# Patient Record
Sex: FEMALE | Race: WHITE | NOT HISPANIC OR LATINO | Employment: FULL TIME | URBAN - METROPOLITAN AREA
[De-identification: names, ages, dates, MRNs, and addresses within clinical notes are randomized per-mention and may not be internally consistent; named-entity substitution may affect disease eponyms.]

---

## 2017-06-06 PROCEDURE — 88305 TISSUE EXAM BY PATHOLOGIST: CPT | Performed by: OBSTETRICS & GYNECOLOGY

## 2017-06-07 ENCOUNTER — LAB REQUISITION (OUTPATIENT)
Dept: LAB | Facility: HOSPITAL | Age: 27
End: 2017-06-07
Payer: COMMERCIAL

## 2017-06-07 DIAGNOSIS — R87.612 LOW GRADE SQUAMOUS INTRAEPITHELIAL LESION ON CYTOLOGIC SMEAR OF CERVIX (LGSIL): ICD-10-CM

## 2017-07-19 ENCOUNTER — ANESTHESIA EVENT (OUTPATIENT)
Dept: PERIOP | Facility: HOSPITAL | Age: 27
End: 2017-07-19
Payer: COMMERCIAL

## 2017-07-20 ENCOUNTER — HOSPITAL ENCOUNTER (OUTPATIENT)
Facility: HOSPITAL | Age: 27
Setting detail: OUTPATIENT SURGERY
Discharge: HOME | End: 2017-07-20
Attending: OBSTETRICS & GYNECOLOGY | Admitting: OBSTETRICS & GYNECOLOGY
Payer: COMMERCIAL

## 2017-07-20 ENCOUNTER — ANESTHESIA (OUTPATIENT)
Dept: PERIOP | Facility: HOSPITAL | Age: 27
End: 2017-07-20
Payer: COMMERCIAL

## 2017-07-20 VITALS
HEART RATE: 63 BPM | HEIGHT: 60 IN | RESPIRATION RATE: 16 BRPM | OXYGEN SATURATION: 99 % | DIASTOLIC BLOOD PRESSURE: 66 MMHG | BODY MASS INDEX: 21.6 KG/M2 | SYSTOLIC BLOOD PRESSURE: 101 MMHG | TEMPERATURE: 97.5 F | WEIGHT: 110 LBS

## 2017-07-20 DIAGNOSIS — D06.9 SEVERE CERVICAL DYSPLASIA: ICD-10-CM

## 2017-07-20 PROBLEM — Z98.890 S/P LEEP: Status: ACTIVE | Noted: 2017-07-20

## 2017-07-20 LAB — EXT PREGNANCY TEST URINE: NEGATIVE

## 2017-07-20 PROCEDURE — 81025 URINE PREGNANCY TEST: CPT | Performed by: OBSTETRICS & GYNECOLOGY

## 2017-07-20 PROCEDURE — 88307 TISSUE EXAM BY PATHOLOGIST: CPT | Performed by: OBSTETRICS & GYNECOLOGY

## 2017-07-20 RX ORDER — FENTANYL CITRATE/PF 50 MCG/ML
50 SYRINGE (ML) INJECTION
Status: DISCONTINUED | OUTPATIENT
Start: 2017-07-20 | End: 2017-07-20 | Stop reason: HOSPADM

## 2017-07-20 RX ORDER — IODINE SOLUTION STRONG 5% (LUGOL'S) 5 %
SOLUTION ORAL AS NEEDED
Status: DISCONTINUED | OUTPATIENT
Start: 2017-07-20 | End: 2017-07-20 | Stop reason: HOSPADM

## 2017-07-20 RX ORDER — FENTANYL CITRATE 50 UG/ML
INJECTION, SOLUTION INTRAMUSCULAR; INTRAVENOUS AS NEEDED
Status: DISCONTINUED | OUTPATIENT
Start: 2017-07-20 | End: 2017-07-20 | Stop reason: SURG

## 2017-07-20 RX ORDER — IBUPROFEN 600 MG/1
600 TABLET ORAL EVERY 6 HOURS PRN
Status: DISCONTINUED | OUTPATIENT
Start: 2017-07-20 | End: 2017-07-20 | Stop reason: HOSPADM

## 2017-07-20 RX ORDER — KETOROLAC TROMETHAMINE 30 MG/ML
INJECTION, SOLUTION INTRAMUSCULAR; INTRAVENOUS AS NEEDED
Status: DISCONTINUED | OUTPATIENT
Start: 2017-07-20 | End: 2017-07-20 | Stop reason: SURG

## 2017-07-20 RX ORDER — MEPERIDINE HYDROCHLORIDE 50 MG/ML
12.5 INJECTION INTRAMUSCULAR; INTRAVENOUS; SUBCUTANEOUS AS NEEDED
Status: DISCONTINUED | OUTPATIENT
Start: 2017-07-20 | End: 2017-07-20 | Stop reason: HOSPADM

## 2017-07-20 RX ORDER — PROPOFOL 10 MG/ML
INJECTION, EMULSION INTRAVENOUS AS NEEDED
Status: DISCONTINUED | OUTPATIENT
Start: 2017-07-20 | End: 2017-07-20 | Stop reason: SURG

## 2017-07-20 RX ORDER — FERRIC SUBSULFATE 0.21 G/G
LIQUID TOPICAL AS NEEDED
Status: DISCONTINUED | OUTPATIENT
Start: 2017-07-20 | End: 2017-07-20 | Stop reason: HOSPADM

## 2017-07-20 RX ORDER — LIDOCAINE HYDROCHLORIDE 10 MG/ML
INJECTION, SOLUTION INFILTRATION; PERINEURAL AS NEEDED
Status: DISCONTINUED | OUTPATIENT
Start: 2017-07-20 | End: 2017-07-20 | Stop reason: SURG

## 2017-07-20 RX ORDER — SODIUM CHLORIDE 9 MG/ML
125 INJECTION, SOLUTION INTRAVENOUS CONTINUOUS
Status: DISCONTINUED | OUTPATIENT
Start: 2017-07-20 | End: 2017-07-20 | Stop reason: HOSPADM

## 2017-07-20 RX ORDER — ONDANSETRON 2 MG/ML
4 INJECTION INTRAMUSCULAR; INTRAVENOUS EVERY 6 HOURS PRN
Status: DISCONTINUED | OUTPATIENT
Start: 2017-07-20 | End: 2017-07-20 | Stop reason: HOSPADM

## 2017-07-20 RX ORDER — ONDANSETRON 2 MG/ML
INJECTION INTRAMUSCULAR; INTRAVENOUS AS NEEDED
Status: DISCONTINUED | OUTPATIENT
Start: 2017-07-20 | End: 2017-07-20 | Stop reason: SURG

## 2017-07-20 RX ORDER — MIDAZOLAM HYDROCHLORIDE 1 MG/ML
INJECTION INTRAMUSCULAR; INTRAVENOUS AS NEEDED
Status: DISCONTINUED | OUTPATIENT
Start: 2017-07-20 | End: 2017-07-20 | Stop reason: SURG

## 2017-07-20 RX ADMIN — LIDOCAINE HYDROCHLORIDE 60 MG: 10 INJECTION, SOLUTION INFILTRATION; PERINEURAL at 08:34

## 2017-07-20 RX ADMIN — MIDAZOLAM HYDROCHLORIDE 2 MG: 1 INJECTION, SOLUTION INTRAMUSCULAR; INTRAVENOUS at 08:32

## 2017-07-20 RX ADMIN — KETOROLAC TROMETHAMINE 30 MG: 30 INJECTION, SOLUTION INTRAMUSCULAR at 08:54

## 2017-07-20 RX ADMIN — FENTANYL CITRATE 50 MCG: 50 INJECTION, SOLUTION INTRAMUSCULAR; INTRAVENOUS at 08:50

## 2017-07-20 RX ADMIN — PROPOFOL 300 MG: 10 INJECTION, EMULSION INTRAVENOUS at 08:34

## 2017-07-20 RX ADMIN — SODIUM CHLORIDE 125 ML/HR: 0.9 INJECTION, SOLUTION INTRAVENOUS at 07:50

## 2017-07-20 RX ADMIN — DEXAMETHASONE SODIUM PHOSPHATE 8 MG: 10 INJECTION INTRAMUSCULAR; INTRAVENOUS at 08:44

## 2017-07-20 RX ADMIN — FENTANYL CITRATE 50 MCG: 50 INJECTION, SOLUTION INTRAMUSCULAR; INTRAVENOUS at 08:40

## 2017-07-20 RX ADMIN — ONDANSETRON HYDROCHLORIDE 8 MG: 2 INJECTION, SOLUTION INTRAVENOUS at 08:44

## 2019-11-20 PROCEDURE — G0145 SCR C/V CYTO,THINLAYER,RESCR: HCPCS | Performed by: OBSTETRICS & GYNECOLOGY

## 2019-11-20 PROCEDURE — 87624 HPV HI-RISK TYP POOLED RSLT: CPT | Performed by: OBSTETRICS & GYNECOLOGY

## 2019-11-21 ENCOUNTER — LAB REQUISITION (OUTPATIENT)
Dept: LAB | Facility: HOSPITAL | Age: 29
End: 2019-11-21
Payer: COMMERCIAL

## 2019-11-21 DIAGNOSIS — Z01.419 ENCOUNTER FOR GYNECOLOGICAL EXAMINATION (GENERAL) (ROUTINE) WITHOUT ABNORMAL FINDINGS: ICD-10-CM

## 2019-11-23 LAB
HPV HR 12 DNA CVX QL NAA+PROBE: NEGATIVE
HPV16 DNA CVX QL NAA+PROBE: NEGATIVE
HPV18 DNA CVX QL NAA+PROBE: NEGATIVE

## 2019-11-26 LAB
LAB AP GYN PRIMARY INTERPRETATION: NORMAL
LAB AP LMP: NORMAL
Lab: NORMAL

## 2021-02-24 ENCOUNTER — LAB (OUTPATIENT)
Dept: LAB | Facility: HOSPITAL | Age: 31
End: 2021-02-24
Attending: OBSTETRICS & GYNECOLOGY
Payer: COMMERCIAL

## 2021-02-24 ENCOUNTER — TRANSCRIBE ORDERS (OUTPATIENT)
Dept: ADMINISTRATIVE | Facility: HOSPITAL | Age: 31
End: 2021-02-24

## 2021-02-24 DIAGNOSIS — Z32.01 PREGNANCY EXAMINATION OR TEST, POSITIVE RESULT: ICD-10-CM

## 2021-02-24 DIAGNOSIS — O20.9 HEMORRHAGE BEFORE 22 WEEKS GESTATION: ICD-10-CM

## 2021-02-24 DIAGNOSIS — Z32.01 PREGNANCY EXAMINATION OR TEST, POSITIVE RESULT: Primary | ICD-10-CM

## 2021-02-24 LAB
ABO GROUP BLD: NORMAL
B-HCG SERPL-ACNC: 26.8 MIU/ML
BLD GP AB SCN SERPL QL: NEGATIVE
HCG SERPL QL: POSITIVE
RH BLD: POSITIVE
SPECIMEN EXPIRATION DATE: NORMAL

## 2021-02-24 PROCEDURE — 36415 COLL VENOUS BLD VENIPUNCTURE: CPT

## 2021-02-24 PROCEDURE — 84702 CHORIONIC GONADOTROPIN TEST: CPT

## 2021-02-24 PROCEDURE — 86850 RBC ANTIBODY SCREEN: CPT

## 2021-02-24 PROCEDURE — 86900 BLOOD TYPING SEROLOGIC ABO: CPT

## 2021-02-24 PROCEDURE — 86901 BLOOD TYPING SEROLOGIC RH(D): CPT

## 2021-03-01 ENCOUNTER — LAB (OUTPATIENT)
Dept: LAB | Facility: HOSPITAL | Age: 31
End: 2021-03-01
Attending: OBSTETRICS & GYNECOLOGY
Payer: COMMERCIAL

## 2021-03-01 DIAGNOSIS — Z32.01 PREGNANCY EXAMINATION OR TEST, POSITIVE RESULT: ICD-10-CM

## 2021-03-01 DIAGNOSIS — O20.9 HEMORRHAGE BEFORE 22 WEEKS GESTATION: ICD-10-CM

## 2021-03-01 LAB — B-HCG SERPL-ACNC: 21 MIU/ML

## 2021-03-01 PROCEDURE — 36415 COLL VENOUS BLD VENIPUNCTURE: CPT

## 2021-03-01 PROCEDURE — 84702 CHORIONIC GONADOTROPIN TEST: CPT

## 2021-03-08 ENCOUNTER — LAB (OUTPATIENT)
Dept: LAB | Facility: HOSPITAL | Age: 31
End: 2021-03-08
Attending: OBSTETRICS & GYNECOLOGY
Payer: COMMERCIAL

## 2021-03-08 DIAGNOSIS — Z32.01 PREGNANCY EXAMINATION OR TEST, POSITIVE RESULT: ICD-10-CM

## 2021-03-08 DIAGNOSIS — O20.9 HEMORRHAGE BEFORE 22 WEEKS GESTATION: ICD-10-CM

## 2021-03-08 LAB — B-HCG SERPL-ACNC: <2 MIU/ML

## 2021-03-08 PROCEDURE — 84702 CHORIONIC GONADOTROPIN TEST: CPT

## 2021-03-08 PROCEDURE — 36415 COLL VENOUS BLD VENIPUNCTURE: CPT

## 2021-08-13 PROCEDURE — G0145 SCR C/V CYTO,THINLAYER,RESCR: HCPCS | Performed by: OBSTETRICS & GYNECOLOGY

## 2021-08-13 PROCEDURE — G0476 HPV COMBO ASSAY CA SCREEN: HCPCS | Performed by: OBSTETRICS & GYNECOLOGY

## 2021-08-16 ENCOUNTER — LAB REQUISITION (OUTPATIENT)
Dept: LAB | Facility: HOSPITAL | Age: 31
End: 2021-08-16
Payer: COMMERCIAL

## 2021-08-16 DIAGNOSIS — Z12.4 ENCOUNTER FOR SCREENING FOR MALIGNANT NEOPLASM OF CERVIX: ICD-10-CM

## 2021-08-18 LAB
HPV HR 12 DNA CVX QL NAA+PROBE: NEGATIVE
HPV16 DNA CVX QL NAA+PROBE: NEGATIVE
HPV18 DNA CVX QL NAA+PROBE: NEGATIVE
LAB AP GYN PRIMARY INTERPRETATION: NORMAL
Lab: NORMAL

## 2021-08-19 ENCOUNTER — APPOINTMENT (OUTPATIENT)
Dept: LAB | Age: 31
End: 2021-08-19
Payer: COMMERCIAL

## 2021-08-19 DIAGNOSIS — N97.8 FEMALE INFERTILITY OF OTHER ORIGIN: ICD-10-CM

## 2021-08-19 LAB — PROGEST SERPL-MCNC: 8.2 NG/ML

## 2021-08-19 PROCEDURE — 84144 ASSAY OF PROGESTERONE: CPT

## 2021-08-19 PROCEDURE — 36415 COLL VENOUS BLD VENIPUNCTURE: CPT

## 2021-09-22 ENCOUNTER — APPOINTMENT (OUTPATIENT)
Dept: LAB | Age: 31
End: 2021-09-22
Payer: COMMERCIAL

## 2021-09-22 DIAGNOSIS — Z31.41 ENCOUNTER FOR FERTILITY TESTING: ICD-10-CM

## 2021-09-22 LAB
PROLACTIN SERPL-MCNC: 5.5 NG/ML
TSH SERPL DL<=0.05 MIU/L-ACNC: 1.2 UIU/ML (ref 0.36–3.74)

## 2021-09-22 PROCEDURE — 84146 ASSAY OF PROLACTIN: CPT

## 2021-09-22 PROCEDURE — 36415 COLL VENOUS BLD VENIPUNCTURE: CPT

## 2021-09-22 PROCEDURE — 84443 ASSAY THYROID STIM HORMONE: CPT

## 2021-10-18 ENCOUNTER — HOSPITAL ENCOUNTER (OUTPATIENT)
Dept: RADIOLOGY | Facility: HOSPITAL | Age: 31
Discharge: HOME | End: 2021-10-18
Attending: OBSTETRICS & GYNECOLOGY | Admitting: RADIOLOGY
Payer: COMMERCIAL

## 2021-10-18 DIAGNOSIS — Z31.41 ENCOUNTER FOR FERTILITY TESTING: ICD-10-CM

## 2021-10-18 PROCEDURE — 58340 CATHETER FOR HYSTEROGRAPHY: CPT

## 2021-10-18 PROCEDURE — 74740 X-RAY FEMALE GENITAL TRACT: CPT

## 2021-10-18 RX ADMIN — IOHEXOL 4 ML: 240 INJECTION, SOLUTION INTRATHECAL; INTRAVASCULAR; INTRAVENOUS; ORAL at 09:05

## 2022-01-10 ENCOUNTER — APPOINTMENT (OUTPATIENT)
Dept: LAB | Age: 32
End: 2022-01-10
Payer: COMMERCIAL

## 2022-01-10 DIAGNOSIS — N97.9 PRIMARY FEMALE INFERTILITY: ICD-10-CM

## 2022-01-10 LAB — ESTRADIOL SERPL-MCNC: 129 PG/ML

## 2022-01-10 PROCEDURE — 82670 ASSAY OF TOTAL ESTRADIOL: CPT

## 2022-01-10 PROCEDURE — 36415 COLL VENOUS BLD VENIPUNCTURE: CPT

## 2022-01-17 ENCOUNTER — APPOINTMENT (OUTPATIENT)
Dept: LAB | Age: 32
End: 2022-01-17
Payer: COMMERCIAL

## 2022-01-17 DIAGNOSIS — N97.9 PRIMARY FEMALE INFERTILITY: ICD-10-CM

## 2022-01-17 LAB — PROGEST SERPL-MCNC: 4 NG/ML

## 2022-01-17 PROCEDURE — 84144 ASSAY OF PROGESTERONE: CPT

## 2022-01-17 PROCEDURE — 36415 COLL VENOUS BLD VENIPUNCTURE: CPT

## 2022-02-11 ENCOUNTER — APPOINTMENT (OUTPATIENT)
Dept: LAB | Age: 32
End: 2022-02-11
Payer: COMMERCIAL

## 2022-02-11 DIAGNOSIS — N97.9 FEMALE INFERTILITY, UNSPECIFIED: ICD-10-CM

## 2022-02-11 LAB — PROGEST SERPL-MCNC: 19.2 NG/ML

## 2022-02-11 PROCEDURE — 84144 ASSAY OF PROGESTERONE: CPT

## 2022-02-11 PROCEDURE — 36415 COLL VENOUS BLD VENIPUNCTURE: CPT

## 2022-03-02 ENCOUNTER — APPOINTMENT (OUTPATIENT)
Dept: LAB | Age: 32
End: 2022-03-02
Payer: COMMERCIAL

## 2022-03-02 DIAGNOSIS — N97.9 FEMALE INFERTILITY: ICD-10-CM

## 2022-03-02 LAB — ESTRADIOL SERPL-MCNC: 742 PG/ML

## 2022-03-02 PROCEDURE — 82670 ASSAY OF TOTAL ESTRADIOL: CPT

## 2022-03-02 PROCEDURE — 36415 COLL VENOUS BLD VENIPUNCTURE: CPT

## 2022-03-09 ENCOUNTER — APPOINTMENT (OUTPATIENT)
Dept: LAB | Age: 32
End: 2022-03-09
Payer: COMMERCIAL

## 2022-03-09 DIAGNOSIS — N97.9 FEMALE INFERTILITY, UNSPECIFIED: ICD-10-CM

## 2022-03-09 LAB — PROGEST SERPL-MCNC: 7.7 NG/ML

## 2022-03-09 PROCEDURE — 36415 COLL VENOUS BLD VENIPUNCTURE: CPT

## 2022-03-09 PROCEDURE — 84144 ASSAY OF PROGESTERONE: CPT

## 2022-04-06 ENCOUNTER — APPOINTMENT (OUTPATIENT)
Dept: LAB | Age: 32
End: 2022-04-06
Payer: COMMERCIAL

## 2022-04-06 DIAGNOSIS — N97.0 FEMALE INFERTILITY ASSOCIATED WITH ANOVULATION: ICD-10-CM

## 2022-04-06 LAB — PROGEST SERPL-MCNC: 8.2 NG/ML

## 2022-04-06 PROCEDURE — 36415 COLL VENOUS BLD VENIPUNCTURE: CPT

## 2022-04-06 PROCEDURE — 84144 ASSAY OF PROGESTERONE: CPT

## 2022-05-06 ENCOUNTER — APPOINTMENT (OUTPATIENT)
Dept: LAB | Age: 32
End: 2022-05-06
Payer: COMMERCIAL

## 2022-05-06 DIAGNOSIS — Z32.01 PREGNANCY EXAMINATION OR TEST, POSITIVE RESULT: ICD-10-CM

## 2022-05-06 LAB — B-HCG SERPL-ACNC: 14 MIU/ML

## 2022-05-06 PROCEDURE — 36415 COLL VENOUS BLD VENIPUNCTURE: CPT

## 2022-05-06 PROCEDURE — 84702 CHORIONIC GONADOTROPIN TEST: CPT

## 2022-05-12 ENCOUNTER — APPOINTMENT (OUTPATIENT)
Dept: LAB | Age: 32
End: 2022-05-12
Payer: COMMERCIAL

## 2022-05-12 DIAGNOSIS — Z32.01 PREGNANCY EXAMINATION OR TEST, POSITIVE RESULT: ICD-10-CM

## 2022-05-12 LAB — B-HCG SERPL-ACNC: 354 MIU/ML

## 2022-05-12 PROCEDURE — 36415 COLL VENOUS BLD VENIPUNCTURE: CPT

## 2022-05-12 PROCEDURE — 84702 CHORIONIC GONADOTROPIN TEST: CPT

## 2022-05-19 ENCOUNTER — APPOINTMENT (OUTPATIENT)
Dept: LAB | Age: 32
End: 2022-05-19
Payer: COMMERCIAL

## 2022-05-19 DIAGNOSIS — Z32.01 PREGNANCY EXAMINATION OR TEST, POSITIVE RESULT: ICD-10-CM

## 2022-05-19 LAB — B-HCG SERPL-ACNC: 5198 MIU/ML

## 2022-05-19 PROCEDURE — 84702 CHORIONIC GONADOTROPIN TEST: CPT

## 2022-05-19 PROCEDURE — 36415 COLL VENOUS BLD VENIPUNCTURE: CPT

## 2022-07-05 ENCOUNTER — APPOINTMENT (OUTPATIENT)
Dept: LAB | Age: 32
End: 2022-07-05
Payer: COMMERCIAL

## 2022-07-05 DIAGNOSIS — Z36.89 SCREENING FOR PREGNANCY-ASSOCIATED PLASMA PROTEIN A: ICD-10-CM

## 2022-07-05 DIAGNOSIS — Z3A.08 8 WEEKS GESTATION OF PREGNANCY: ICD-10-CM

## 2022-07-05 LAB
ABO GROUP BLD: NORMAL
ERYTHROCYTE [DISTWIDTH] IN BLOOD BY AUTOMATED COUNT: 12.7 % (ref 11.6–15.1)
HBV SURFACE AG SER QL: NORMAL
HCT VFR BLD AUTO: 38 % (ref 34.8–46.1)
HCV AB SER QL: NORMAL
HGB BLD-MCNC: 13.1 G/DL (ref 11.5–15.4)
MCH RBC QN AUTO: 30.1 PG (ref 26.8–34.3)
MCHC RBC AUTO-ENTMCNC: 34.5 G/DL (ref 31.4–37.4)
MCV RBC AUTO: 87 FL (ref 82–98)
PLATELET # BLD AUTO: 258 THOUSANDS/UL (ref 149–390)
PMV BLD AUTO: 11.3 FL (ref 8.9–12.7)
RBC # BLD AUTO: 4.35 MILLION/UL (ref 3.81–5.12)
RH BLD: POSITIVE
RUBV IGG SERPL IA-ACNC: 155.1 IU/ML
WBC # BLD AUTO: 9.03 THOUSAND/UL (ref 4.31–10.16)

## 2022-07-05 PROCEDURE — 86900 BLOOD TYPING SEROLOGIC ABO: CPT

## 2022-07-05 PROCEDURE — 86787 VARICELLA-ZOSTER ANTIBODY: CPT

## 2022-07-05 PROCEDURE — 86901 BLOOD TYPING SEROLOGIC RH(D): CPT

## 2022-07-05 PROCEDURE — 80307 DRUG TEST PRSMV CHEM ANLYZR: CPT

## 2022-07-05 PROCEDURE — 87340 HEPATITIS B SURFACE AG IA: CPT

## 2022-07-05 PROCEDURE — 87389 HIV-1 AG W/HIV-1&-2 AB AG IA: CPT

## 2022-07-05 PROCEDURE — 86762 RUBELLA ANTIBODY: CPT

## 2022-07-05 PROCEDURE — 86803 HEPATITIS C AB TEST: CPT

## 2022-07-05 PROCEDURE — 86592 SYPHILIS TEST NON-TREP QUAL: CPT

## 2022-07-05 PROCEDURE — 85027 COMPLETE CBC AUTOMATED: CPT

## 2022-07-06 LAB
AMPHETAMINES UR QL SCN: NEGATIVE NG/ML
BARBITURATES UR QL SCN: NEGATIVE NG/ML
BENZODIAZ UR QL: NEGATIVE NG/ML
BZE UR QL: NEGATIVE NG/ML
CANNABINOIDS UR QL SCN: NEGATIVE NG/ML
HIV 1+2 AB+HIV1 P24 AG SERPL QL IA: NORMAL
METHADONE UR QL SCN: NEGATIVE NG/ML
OPIATES UR QL: NEGATIVE NG/ML
PCP UR QL: NEGATIVE NG/ML
PROPOXYPH UR QL SCN: NEGATIVE NG/ML
RPR SER QL: NORMAL
VZV IGG SER QL IA: NORMAL

## 2022-12-20 ENCOUNTER — HOSPITAL ENCOUNTER (OUTPATIENT)
Facility: HOSPITAL | Age: 32
Discharge: HOME | End: 2022-12-20
Attending: OBSTETRICS & GYNECOLOGY | Admitting: OBSTETRICS & GYNECOLOGY

## 2022-12-20 VITALS
HEART RATE: 71 BPM | DIASTOLIC BLOOD PRESSURE: 66 MMHG | RESPIRATION RATE: 17 BRPM | TEMPERATURE: 97.9 F | SYSTOLIC BLOOD PRESSURE: 115 MMHG

## 2022-12-20 PROBLEM — Z3A.36 36 WEEKS GESTATION OF PREGNANCY: Status: ACTIVE | Noted: 2022-12-20

## 2022-12-20 RX ORDER — ACETAMINOPHEN 325 MG/1
975 TABLET ORAL ONCE
Status: COMPLETED | OUTPATIENT
Start: 2022-12-20 | End: 2022-12-20

## 2022-12-20 RX ADMIN — ACETAMINOPHEN 975 MG: 325 TABLET ORAL at 17:37

## 2022-12-20 NOTE — PROGRESS NOTES
L&D Triage Note - OB/GYN  Fly Holloway 33 y o  female MRN: [id number]  Unit/Bed#: L&D 326-01 Encounter: [id number]      Assessment:  33 y o   at 36w3d with contractions and increased vaginal discharge, not in labor with intact membranes, discharge home    Plan:  1  Contractions  - SVE closed/thick/high  - NST reactive, toco q2-4 minutes, mild to moderate intensity  - Reviewed labor precautions with patient  She has an appointment with her primary OB/GYN tomorrow and lives near hospital  Given absence of cervical change in the setting of contractions lasting several days, low clinical suspicion for active labor at this time  Plan for discharge home and to return to triage if symptoms worsen    2  Vaginal discharge  - Physiologic discharge of pregnancy  - R/o ROM workup negative (See below)    3  IUP at 36w3d  - GBS collected today    D/w Dr Beth Childs      ______________________________________________________________________      Chief Compliant: contractions, vaginal discharge    TIME: 1630  Subjective:  33 y o   at 36w3d who presents for evaluation of contractions and increased vaginal discharge  Patient reports contractions that have been occurring irregularly over the last week, but today, they started to increase in frequency and intensity  She rates her contractions a 2-4 out of 10  She occasionally has stronger contractions that she is unable to talk through  States she has increased vaginal discharge over the last few days  States it is clear and she does not think that is amniotic fluid  Denies vaginal bleeding, decreased fetal movement, recent intercourse  Has not had a recent cervical check  Objective:  Vitals:    22 1611   Temp: 97 9 °F (36 6 °C)     SSE: nulliparous appearing cervix appears visually closed  Physiologic discharge present in posterior vaginal vault  No leaking from cervix present  No pooling in the vagina  No vaginal bleeding present      Microscopy: negative for clue cells, trichomonas, pseudohyphae, ferning    Nitrazine: positive     SVE: 0 / 0% / -3  FHT:  120 bpm / Moderate 6 - 25 bpm / 15x15 accelerations, no decelerations  [place]: q2-4 minutes              Herminio Sousa MD 12/20/2022 5:02 PM

## 2022-12-22 LAB — GP B STREP DNA SPEC QL NAA+PROBE: NEGATIVE

## 2023-01-09 ENCOUNTER — HOSPITAL ENCOUNTER (INPATIENT)
Facility: HOSPITAL | Age: 33
LOS: 4 days | Discharge: HOME | End: 2023-01-13
Attending: OBSTETRICS & GYNECOLOGY | Admitting: OBSTETRICS & GYNECOLOGY

## 2023-01-09 ENCOUNTER — HOSPITAL ENCOUNTER (OUTPATIENT)
Dept: LABOR AND DELIVERY | Facility: HOSPITAL | Age: 33
Discharge: HOME | End: 2023-01-09

## 2023-01-09 DIAGNOSIS — Z87.59 STATUS POST VACUUM-ASSISTED VAGINAL DELIVERY: ICD-10-CM

## 2023-01-09 DIAGNOSIS — Z3A.39 39 WEEKS GESTATION OF PREGNANCY: Primary | ICD-10-CM

## 2023-01-09 PROBLEM — Z87.891 FORMER SMOKER: Status: ACTIVE | Noted: 2023-01-09

## 2023-01-09 LAB
ABO GROUP BLD: NORMAL
BLD GP AB SCN SERPL QL: NEGATIVE
ERYTHROCYTE [DISTWIDTH] IN BLOOD BY AUTOMATED COUNT: 13.5 % (ref 11.6–15.1)
HCT VFR BLD AUTO: 32.4 % (ref 34.8–46.1)
HGB BLD-MCNC: 10.8 G/DL (ref 11.5–15.4)
MCH RBC QN AUTO: 29.1 PG (ref 26.8–34.3)
MCHC RBC AUTO-ENTMCNC: 33.3 G/DL (ref 31.4–37.4)
MCV RBC AUTO: 87 FL (ref 82–98)
PLATELET # BLD AUTO: 200 THOUSANDS/UL (ref 149–390)
PMV BLD AUTO: 11.3 FL (ref 8.9–12.7)
RBC # BLD AUTO: 3.71 MILLION/UL (ref 3.81–5.12)
RH BLD: POSITIVE
SPECIMEN EXPIRATION DATE: NORMAL
WBC # BLD AUTO: 9.73 THOUSAND/UL (ref 4.31–10.16)

## 2023-01-09 PROCEDURE — 3E0P7VZ INTRODUCTION OF HORMONE INTO FEMALE REPRODUCTIVE, VIA NATURAL OR ARTIFICIAL OPENING: ICD-10-PCS | Performed by: OBSTETRICS & GYNECOLOGY

## 2023-01-09 PROCEDURE — 4A1HX4Z MONITORING OF PRODUCTS OF CONCEPTION, CARDIAC ELECTRICAL ACTIVITY, EXTERNAL APPROACH: ICD-10-PCS | Performed by: OBSTETRICS & GYNECOLOGY

## 2023-01-09 RX ORDER — SODIUM CHLORIDE, SODIUM LACTATE, POTASSIUM CHLORIDE, CALCIUM CHLORIDE 600; 310; 30; 20 MG/100ML; MG/100ML; MG/100ML; MG/100ML
125 INJECTION, SOLUTION INTRAVENOUS CONTINUOUS
Status: DISCONTINUED | OUTPATIENT
Start: 2023-01-09 | End: 2023-01-13 | Stop reason: HOSPADM

## 2023-01-09 RX ORDER — ONDANSETRON 2 MG/ML
4 INJECTION INTRAMUSCULAR; INTRAVENOUS EVERY 6 HOURS PRN
Status: DISCONTINUED | OUTPATIENT
Start: 2023-01-09 | End: 2023-01-11

## 2023-01-09 RX ORDER — BUPIVACAINE HYDROCHLORIDE 2.5 MG/ML
30 INJECTION, SOLUTION EPIDURAL; INFILTRATION; INTRACAUDAL ONCE AS NEEDED
Status: COMPLETED | OUTPATIENT
Start: 2023-01-09 | End: 2023-01-11

## 2023-01-09 RX ADMIN — Medication 25 MCG: at 22:16

## 2023-01-10 ENCOUNTER — ANESTHESIA EVENT (INPATIENT)
Dept: ANESTHESIOLOGY | Facility: HOSPITAL | Age: 33
End: 2023-01-10

## 2023-01-10 ENCOUNTER — ANESTHESIA (INPATIENT)
Dept: ANESTHESIOLOGY | Facility: HOSPITAL | Age: 33
End: 2023-01-10

## 2023-01-10 LAB
FLUAV RNA RESP QL NAA+PROBE: NEGATIVE
FLUBV RNA RESP QL NAA+PROBE: NEGATIVE
RPR SER QL: NORMAL
RSV RNA RESP QL NAA+PROBE: NEGATIVE
SARS-COV-2 RNA RESP QL NAA+PROBE: NEGATIVE

## 2023-01-10 PROCEDURE — 0KQM0ZZ REPAIR PERINEUM MUSCLE, OPEN APPROACH: ICD-10-PCS | Performed by: OBSTETRICS & GYNECOLOGY

## 2023-01-10 RX ORDER — BUTORPHANOL TARTRATE 1 MG/ML
1 INJECTION, SOLUTION INTRAMUSCULAR; INTRAVENOUS ONCE
Status: COMPLETED | OUTPATIENT
Start: 2023-01-10 | End: 2023-01-10

## 2023-01-10 RX ORDER — ROPIVACAINE HYDROCHLORIDE 2 MG/ML
INJECTION, SOLUTION EPIDURAL; INFILTRATION; PERINEURAL CONTINUOUS PRN
Status: DISCONTINUED | OUTPATIENT
Start: 2023-01-10 | End: 2023-01-11 | Stop reason: HOSPADM

## 2023-01-10 RX ORDER — OXYTOCIN/RINGER'S LACTATE 30/500 ML
1-30 PLASTIC BAG, INJECTION (ML) INTRAVENOUS
Status: DISCONTINUED | OUTPATIENT
Start: 2023-01-10 | End: 2023-01-13 | Stop reason: HOSPADM

## 2023-01-10 RX ORDER — ACETAMINOPHEN 325 MG/1
650 TABLET ORAL EVERY 6 HOURS PRN
Status: DISCONTINUED | OUTPATIENT
Start: 2023-01-10 | End: 2023-01-11

## 2023-01-10 RX ORDER — PROMETHAZINE HYDROCHLORIDE 25 MG/ML
12.5 INJECTION, SOLUTION INTRAMUSCULAR; INTRAVENOUS ONCE
Status: COMPLETED | OUTPATIENT
Start: 2023-01-10 | End: 2023-01-10

## 2023-01-10 RX ORDER — ROPIVACAINE HYDROCHLORIDE 2 MG/ML
INJECTION, SOLUTION EPIDURAL; INFILTRATION; PERINEURAL AS NEEDED
Status: DISCONTINUED | OUTPATIENT
Start: 2023-01-10 | End: 2023-01-11 | Stop reason: HOSPADM

## 2023-01-10 RX ADMIN — ROPIVACAINE HYDROCHLORIDE 3 ML: 2 INJECTION EPIDURAL; INFILTRATION; PERINEURAL at 19:35

## 2023-01-10 RX ADMIN — SODIUM CHLORIDE, SODIUM LACTATE, POTASSIUM CHLORIDE, AND CALCIUM CHLORIDE 999 ML/HR: 600; 310; 30; 20 INJECTION, SOLUTION INTRAVENOUS at 17:45

## 2023-01-10 RX ADMIN — ROPIVACAINE HYDROCHLORIDE 10 ML/HR: 2 INJECTION, SOLUTION EPIDURAL; INFILTRATION; PERINEURAL at 19:33

## 2023-01-10 RX ADMIN — ROPIVACAINE HYDROCHLORIDE: 2 INJECTION, SOLUTION EPIDURAL; INFILTRATION at 20:58

## 2023-01-10 RX ADMIN — ACETAMINOPHEN 650 MG: 325 TABLET, FILM COATED ORAL at 20:58

## 2023-01-10 RX ADMIN — ROPIVACAINE HYDROCHLORIDE 5 ML: 2 INJECTION EPIDURAL; INFILTRATION; PERINEURAL at 19:33

## 2023-01-10 RX ADMIN — BUTORPHANOL TARTRATE 1 MG: 1 INJECTION, SOLUTION INTRAMUSCULAR; INTRAVENOUS at 16:34

## 2023-01-10 RX ADMIN — SODIUM CHLORIDE, SODIUM LACTATE, POTASSIUM CHLORIDE, AND CALCIUM CHLORIDE 125 ML/HR: 600; 310; 30; 20 INJECTION, SOLUTION INTRAVENOUS at 21:38

## 2023-01-10 RX ADMIN — SODIUM CHLORIDE, SODIUM LACTATE, POTASSIUM CHLORIDE, AND CALCIUM CHLORIDE 125 ML/HR: 600; 310; 30; 20 INJECTION, SOLUTION INTRAVENOUS at 10:03

## 2023-01-10 RX ADMIN — SODIUM CHLORIDE, SODIUM LACTATE, POTASSIUM CHLORIDE, AND CALCIUM CHLORIDE 999 ML/HR: 600; 310; 30; 20 INJECTION, SOLUTION INTRAVENOUS at 18:49

## 2023-01-10 RX ADMIN — PROMETHAZINE HYDROCHLORIDE 12.5 MG: 25 INJECTION INTRAMUSCULAR; INTRAVENOUS at 04:54

## 2023-01-10 RX ADMIN — BUTORPHANOL TARTRATE 1 MG: 1 INJECTION, SOLUTION INTRAMUSCULAR; INTRAVENOUS at 10:11

## 2023-01-10 RX ADMIN — BUTORPHANOL TARTRATE 1 MG: 1 INJECTION, SOLUTION INTRAMUSCULAR; INTRAVENOUS at 04:54

## 2023-01-10 RX ADMIN — PROMETHAZINE HYDROCHLORIDE 12.5 MG: 25 INJECTION INTRAMUSCULAR; INTRAVENOUS at 10:11

## 2023-01-10 RX ADMIN — Medication 2 MILLI-UNITS/MIN: at 10:02

## 2023-01-10 RX ADMIN — ACETAMINOPHEN 650 MG: 325 TABLET, FILM COATED ORAL at 14:37

## 2023-01-10 RX ADMIN — PROMETHAZINE HYDROCHLORIDE 12.5 MG: 25 INJECTION INTRAMUSCULAR; INTRAVENOUS at 16:34

## 2023-01-10 RX ADMIN — ONDANSETRON 4 MG: 2 INJECTION INTRAMUSCULAR; INTRAVENOUS at 22:58

## 2023-01-10 NOTE — OB LABOR/OXYTOCIN SAFETY PROGRESS
Oxytocin Safety Progress Check Note - Yovany An 33 y o  female MRN: [id number]    Unit/Bed#: L&D 326-01 Encounter: [id number]    Dose (amrita-units/min) Oxytocin: 8 amrita-units/min  Contraction Frequency (minutes): 3-3 5  Contraction Quality: Moderate  Tachysystole: No   Cervical Dilation: 2        Cervical Effacement: 60  Fetal Station: -3  Baseline Rate: 145 bpm  Fetal Heart Rate: 122 BPM  FHR Category: Category I               Vital Signs:   Vitals:    01/10/23 1152   BP: 117/57   Pulse: 92   Resp:    Temp: 98 2 °F (36 8 °C)       Notes/comments: Patient comfortable and pain is well controlled with stadol  SVE is unchanged at 2/60/-3  FHT is CAT 1  Luis every 2-3 minutes and pitocin is at 8  Plan is to continue to titrate pitocin and recheck in two hours               Libby Larios MD 1/10/2023 12:23 PM

## 2023-01-10 NOTE — OB LABOR/OXYTOCIN SAFETY PROGRESS
Labor Progress Note - Meg Sample 33 y o  female MRN: [id number]    Unit/Bed#: L&D 326-01 Encounter: [id number]       Contraction Frequency (minutes): 3-4  Contraction Quality: Moderate  Tachysystole: No   Cervical Dilation: 2        Cervical Effacement: 60  Fetal Station: -3  Baseline Rate: 135 bpm  Fetal Heart Rate: 122 BPM                  Vital Signs:   Vitals:    01/10/23 0904   BP: 122/71   Pulse: 81   Resp:    Temp:        Notes/comments:   SVE with minimal dilation from last  Discussed with pt findings  Also relayed to primary OB, who presented to bedside shortly after this assessment        Jessica Martin MD 1/10/2023 9:45 AM

## 2023-01-10 NOTE — OB LABOR/OXYTOCIN SAFETY PROGRESS
Oxytocin Safety Progress Check Note - Radha Nichole 33 y o  female MRN: [id number]    Unit/Bed#: L&D 326-01 Encounter: [id number]    Dose (amrita-units/min) Oxytocin: 22 amrita-units/min  Contraction Frequency (minutes): 2-3  Contraction Quality: Moderate  Tachysystole: No   Cervical Dilation: 2        Cervical Effacement: 80  Fetal Station: -2  Baseline Rate: 145 bpm  Fetal Heart Rate: 140 BPM  FHR Category: Category I               Vital Signs:  Vitals:    01/10/23 1745   BP: 133/84   Pulse:    Resp:    Temp:        Notes/comments:   Pt just received Stadol, contractions getting stronger  Planning on epidural  SROm during exam for clear fluid  Continue pitocin titration as needed         Nena Dunn DO 1/10/2023 5:53 PM

## 2023-01-10 NOTE — OB LABOR/OXYTOCIN SAFETY PROGRESS
Labor Progress Note - Meg Sample 33 y o  female MRN: [id number]    Unit/Bed#: L&D 326-01 Encounter: [id number]       Contraction Frequency (minutes): 3-4  Contraction Quality: Moderate  Tachysystole: No   Cervical Dilation: 1        Cervical Effacement: 0  Fetal Station: -3  Baseline Rate: 125 bpm  Fetal Heart Rate: 122 BPM              Vital Signs:   Vitals:    01/10/23 0504   BP: 130/79   Pulse: 74   Resp:    Temp:        Notes/comments:   Pt is feeling contractions  SVE as above  FHT cat 1 kaye q2-4 mins  Day balloon placed, patient tolerated procedure well  Stadol given for pain control      Dr Bar Form aware     Se Bee MD 1/10/2023 5:22 AM

## 2023-01-10 NOTE — OB LABOR/OXYTOCIN SAFETY PROGRESS
Oxytocin Safety Progress Check Note - Venida Sale 33 y o  female MRN: [id number]    Unit/Bed#: L&D 326-01 Encounter: [id number]       Contraction Frequency (minutes): 2-4  Contraction Quality: Moderate  Tachysystole: No   Cervical Dilation: 2        Cervical Effacement: 60  Fetal Station: -3  Baseline Rate: 145 bpm  Fetal Heart Rate: 122 BPM  FHR Category: Category I               Vital Signs:   Vitals:    01/10/23 0904   BP: 122/71   Pulse: 81   Resp:    Temp:        Notes/comments: Patients SVE is 2/60/-3  FHT is CAT 1  Plan is to start pitocin and recheck cervix after titration         Discussed with Dr Kalia Miller MD 1/10/2023 9:52 AM

## 2023-01-10 NOTE — H&P
H&P Exam - Obstetrics   Brenda Schultz 33 y o  female MRN: [id number]  Unit/Bed#: L&D 326-01 Encounter: [id number]      ASSESSMENT:  32 yo  at 39w2d weeks gestation who is being admitted for eIOL  EFW: 7 5 lbs by Leopold's  VTX by transabdominal ultrasound     PLAN:    Pregnancy at 39w2d  * 39 weeks gestation of pregnancy  Assessment & Plan  Admit  Follow up CBC, RPR, Blood Type  Start with cytotec  GBS status: negative  Analgesia and/or epidural at patient request  Anticipate     Former smoker  Assessment & Plan  Quit after finding out she was pregnant    Discussed with Dr Philip Figueroa    This patient will be an INPATIENT  and I certify the anticipated length of stay is >2 Midnights  History of Present Illness     Chief Complaint: Induction of labor    HPI:  Brenda Schultz is a 33 y o   female with an DENISE of 2023, by Last Menstrual Period at 39w2d weeks gestation who is being admitted for eIOL  She has had [place] contractions, but no consistent contractions  Contractions: mild  Loss of fluid: no  Vaginal bleeding: no  Fetal movement: yes    She is a SOLO patient  PREGNANCY COMPLICATIONS:   1) As above    OB History    Para Term  AB Living   1             SAB IAB Ectopic Multiple Live Births                  # Outcome Date GA Lbr Gavin/2nd Weight Sex Delivery Anes PTL Lv   1 Current                Baby complications/comments: none    Review of Systems   Constitutional: Negative for chills and fever  Eyes: Negative for visual disturbance  Respiratory: Negative for cough, chest tightness and shortness of breath  Cardiovascular: Negative for chest pain and palpitations  Gastrointestinal: Negative for abdominal pain  Genitourinary: Negative for frequency, vaginal bleeding, vaginal discharge and vaginal pain  Musculoskeletal: Negative for back pain  Historical Information   No past medical history on file    Past Surgical History:   Procedure Laterality Date   • HYMENECTOMY     • NE CONIZATION CERVIX W/WO D&C RPR ELTRD EXC N/A 7/20/2017    Procedure: BIOPSY LEEP CERVIX;  Surgeon: Citlali Jensen DO;  Location: [facility];  Service: Gynecology   • TONSILLECTOMY       Social History   Social History     Substance and Sexual Activity   Alcohol Use Yes    Comment: social      Social History     Substance and Sexual Activity   Drug Use No     Social History     Tobacco Use   Smoking Status Every Day   Smokeless Tobacco Not on file     Family History: non-contributory    Meds/Allergies      No medications prior to admission  Allergies   Allergen Reactions   • Codeine        OBJECTIVE:    Vitals: Blood pressure 117/81, pulse 88, temperature 98 2 °F (36 8 °C), temperature source Oral, resp  rate 16, height 5' (1 524 m), weight 69 kg (152 lb 3 2 oz), last menstrual period 04/09/2022  Body mass index is 29 72 kg/m²  Physical Exam  HENT:      Head: Normocephalic  Mouth/Throat:      Pharynx: Oropharynx is clear  Eyes:      Conjunctiva/sclera: Conjunctivae normal    Cardiovascular:      Rate and Rhythm: Normal rate  Pulses: Normal pulses  Pulmonary:      Effort: Pulmonary effort is normal    Abdominal:      Palpations: Abdomen is soft  Tenderness: There is no abdominal tenderness  Genitourinary:     General: Normal vulva  Skin:     General: Skin is warm  Neurological:      General: No focal deficit present  Mental Status: She is alert     Psychiatric:         Mood and Affect: Mood normal          Cervix:  0/0/-3    Fetal heart rate:   140 bpm with moderate variability, accelerations, no decelerations    [place]:   occasional contractions    Prenatal Labs:   Blood Type:   Lab Results   Component Value Date/Time    ABO Grouping A 07/05/2022 10:53 AM     , D (Rh type):   Lab Results   Component Value Date/Time    Rh Factor Positive 07/05/2022 10:53 AM     , Antibody Screen: negative   , HCT/HGB:   Lab Results   Component Value Date/Time    Hematocrit 32 4 (L) 01/09/2023 09:45 PM    Hemoglobin 10 8 (L) 01/09/2023 09:45 PM      , MCV:   Lab Results   Component Value Date/Time    MCV 87 01/09/2023 09:45 PM      , Platelets:   Lab Results   Component Value Date/Time    Platelets 325 30/58/2894 09:45 PM      , 1 hour Glucola: normal  Varicella:   Lab Results   Component Value Date/Time    Varicella IgG IMMUNE 07/05/2022 10:53 AM       , Rubella:   Lab Results   Component Value Date/Time    Rubella IgG Quant 155 1 07/05/2022 10:53 AM        , VDRL/RPR:   Lab Results   Component Value Date/Time    RPR Non-Reactive 07/05/2022 10:53 AM      , Hep B:   Lab Results   Component Value Date/Time    Hepatitis B Surface Ag Non-reactive 07/05/2022 10:53 AM      , HIV:   Lab Results   Component Value Date/Time    HIV-1/HIV-2 Ab Non-Reactive 07/05/2022 10:53 AM     , Chlamydia: negative   , Gonorrhea: negative   , Group B Strep:    Lab Results   Component Value Date/Time    Strep Grp B PCR Negative 12/20/2022 04:56 PM          Invasive Devices     Peripheral Intravenous Line  Duration           Peripheral IV 01/09/23 Right Hand <1 day              Oseas Maya MD  PGY-2  1/9/2023  10:11 PM

## 2023-01-10 NOTE — OB LABOR/OXYTOCIN SAFETY PROGRESS
Oxytocin Safety Progress Check Note - Marti Mally 33 y o  female MRN: [id number]    Unit/Bed#: L&D 326-01 Encounter: [id number]    Dose (amrita-units/min) Oxytocin: 18 amrita-units/min  Contraction Frequency (minutes): 2-3  Contraction Quality: Moderate  Tachysystole: No   Cervical Dilation: 2        Cervical Effacement: 60  Fetal Station: -3  Baseline Rate: 135 bpm  Fetal Heart Rate: 122 BPM  FHR Category: Category I               Vital Signs:   Vitals:    01/10/23 1523   BP: 130/73   Pulse: 73   Resp:    Temp:        Notes/comments:   Patient uncomfortable with contractions  SVE unchanged as above  Pitocin currently at 18mU, continue titration  Tracing category 1         Sonia Malin MD 1/10/2023 3:26 PM

## 2023-01-10 NOTE — OB LABOR/OXYTOCIN SAFETY PROGRESS
Labor Progress Note - Letitia Burton 33 y o  female MRN: [id number]    Unit/Bed#: L&D 326-01 Encounter: [id number]       Contraction Frequency (minutes): 2-3  Contraction Quality: Mild  Tachysystole: No   Cervical Dilation: 1        Cervical Effacement: 0  Fetal Station: -3  Baseline Rate: 130 bpm  Fetal Heart Rate: 122 BPM              Vital Signs:   Vitals:    01/09/23 2317   BP: 118/73   Pulse: 76   Resp:    Temp: 98 °F (36 7 °C)       Notes/comments:   Pt starting to feel contractions every 4 mins  SVE as above  FHT cat 1 contractions q4 mins  Membrane sweeping done after patient consent, she tolerated well  Patient requesting to wait until next check for garg balloon  Will hold second dose of cytotec at this time given contraction frequency       Dr Aury Osei aware  Deonte Birmingham MD 1/10/2023 1:08 AM

## 2023-01-10 NOTE — ASSESSMENT & PLAN NOTE
Admit  Follow up CBC, RPR, Blood Type  Start with cytotec  GBS status: negative  Analgesia and/or epidural at patient request  Anticipate

## 2023-01-11 LAB
BASE EXCESS BLDCOA CALC-SCNC: -5.4 MMOL/L (ref 3–11)
BASE EXCESS BLDCOV CALC-SCNC: -3.3 MMOL/L (ref 1–9)
HCO3 BLDCOA-SCNC: 22.6 MMOL/L (ref 17.3–27.3)
HCO3 BLDCOV-SCNC: 20.3 MMOL/L (ref 12.2–28.6)
O2 CT VFR BLDCOA CALC: 10 ML/DL
OXYHGB MFR BLDCOA: 43 %
OXYHGB MFR BLDCOV: 87.8 %
PCO2 BLDCOA: 53.3 MM[HG] (ref 30–60)
PCO2 BLDCOV: 33.6 MM HG (ref 27–43)
PH BLDCOA: 7.25 [PH] (ref 7.23–7.43)
PH BLDCOV: 7.4 [PH] (ref 7.19–7.49)
PO2 BLDCOA: 21.6 MM HG (ref 5–25)
PO2 BLDCOV: 40.8 MM HG (ref 15–45)
SAO2 % BLDCOV: 22.3 ML/DL

## 2023-01-11 RX ORDER — DIPHENHYDRAMINE HYDROCHLORIDE 50 MG/ML
12.5 INJECTION INTRAMUSCULAR; INTRAVENOUS EVERY 6 HOURS PRN
Status: DISCONTINUED | OUTPATIENT
Start: 2023-01-11 | End: 2023-01-11

## 2023-01-11 RX ORDER — GUAIFENESIN 600 MG/1
600 TABLET, EXTENDED RELEASE ORAL EVERY 12 HOURS SCHEDULED
Status: DISCONTINUED | OUTPATIENT
Start: 2023-01-11 | End: 2023-01-11

## 2023-01-11 RX ORDER — CARBOPROST TROMETHAMINE 250 UG/ML
INJECTION, SOLUTION INTRAMUSCULAR
Status: DISCONTINUED
Start: 2023-01-11 | End: 2023-01-11 | Stop reason: WASHOUT

## 2023-01-11 RX ORDER — ACETAMINOPHEN 325 MG/1
650 TABLET ORAL EVERY 4 HOURS PRN
Status: DISCONTINUED | OUTPATIENT
Start: 2023-01-11 | End: 2023-01-13 | Stop reason: HOSPADM

## 2023-01-11 RX ORDER — GUAIFENESIN 600 MG/1
600 TABLET, EXTENDED RELEASE ORAL EVERY 12 HOURS SCHEDULED
Status: DISCONTINUED | OUTPATIENT
Start: 2023-01-11 | End: 2023-01-13 | Stop reason: HOSPADM

## 2023-01-11 RX ORDER — METHYLERGONOVINE MALEATE 0.2 MG/ML
INJECTION INTRAVENOUS
Status: DISCONTINUED
Start: 2023-01-11 | End: 2023-01-11 | Stop reason: WASHOUT

## 2023-01-11 RX ORDER — DOCUSATE SODIUM 100 MG/1
100 CAPSULE, LIQUID FILLED ORAL 2 TIMES DAILY
Status: DISCONTINUED | OUTPATIENT
Start: 2023-01-11 | End: 2023-01-13 | Stop reason: HOSPADM

## 2023-01-11 RX ORDER — DIPHENHYDRAMINE HCL 25 MG
25 TABLET ORAL EVERY 6 HOURS PRN
Status: DISCONTINUED | OUTPATIENT
Start: 2023-01-11 | End: 2023-01-13 | Stop reason: HOSPADM

## 2023-01-11 RX ORDER — CALCIUM CARBONATE 200(500)MG
1000 TABLET,CHEWABLE ORAL DAILY PRN
Status: DISCONTINUED | OUTPATIENT
Start: 2023-01-11 | End: 2023-01-13 | Stop reason: HOSPADM

## 2023-01-11 RX ORDER — DIAPER,BRIEF,INFANT-TODD,DISP
1 EACH MISCELLANEOUS DAILY PRN
Status: DISCONTINUED | OUTPATIENT
Start: 2023-01-11 | End: 2023-01-13 | Stop reason: HOSPADM

## 2023-01-11 RX ORDER — METOCLOPRAMIDE HYDROCHLORIDE 5 MG/ML
10 INJECTION INTRAMUSCULAR; INTRAVENOUS EVERY 6 HOURS PRN
Status: DISCONTINUED | OUTPATIENT
Start: 2023-01-11 | End: 2023-01-11

## 2023-01-11 RX ORDER — ONDANSETRON 2 MG/ML
4 INJECTION INTRAMUSCULAR; INTRAVENOUS EVERY 8 HOURS PRN
Status: DISCONTINUED | OUTPATIENT
Start: 2023-01-11 | End: 2023-01-13 | Stop reason: HOSPADM

## 2023-01-11 RX ORDER — PROMETHAZINE HYDROCHLORIDE 25 MG/ML
12.5 INJECTION, SOLUTION INTRAMUSCULAR; INTRAVENOUS ONCE
Status: COMPLETED | OUTPATIENT
Start: 2023-01-11 | End: 2023-01-11

## 2023-01-11 RX ORDER — DIPHENHYDRAMINE HYDROCHLORIDE 50 MG/ML
25 INJECTION INTRAMUSCULAR; INTRAVENOUS EVERY 6 HOURS PRN
Status: DISCONTINUED | OUTPATIENT
Start: 2023-01-11 | End: 2023-01-11

## 2023-01-11 RX ORDER — IBUPROFEN 600 MG/1
600 TABLET ORAL EVERY 6 HOURS
Status: DISCONTINUED | OUTPATIENT
Start: 2023-01-11 | End: 2023-01-13 | Stop reason: HOSPADM

## 2023-01-11 RX ADMIN — BENZOCAINE AND LEVOMENTHOL 1 APPLICATION: 200; 5 SPRAY TOPICAL at 15:46

## 2023-01-11 RX ADMIN — DOCUSATE SODIUM 100 MG: 100 CAPSULE, LIQUID FILLED ORAL at 17:42

## 2023-01-11 RX ADMIN — BUPIVACAINE HYDROCHLORIDE 30 ML: 2.5 INJECTION, SOLUTION EPIDURAL; INFILTRATION; INTRACAUDAL; PERINEURAL at 13:00

## 2023-01-11 RX ADMIN — WITCH HAZEL 1 PAD: 500 SOLUTION RECTAL; TOPICAL at 15:46

## 2023-01-11 RX ADMIN — ONDANSETRON 4 MG: 2 INJECTION INTRAMUSCULAR; INTRAVENOUS at 06:30

## 2023-01-11 RX ADMIN — ACETAMINOPHEN 650 MG: 325 TABLET ORAL at 23:00

## 2023-01-11 RX ADMIN — ROPIVACAINE HYDROCHLORIDE: 2 INJECTION, SOLUTION EPIDURAL; INFILTRATION at 02:28

## 2023-01-11 RX ADMIN — IBUPROFEN 600 MG: 600 TABLET, FILM COATED ORAL at 21:12

## 2023-01-11 RX ADMIN — ROPIVACAINE HYDROCHLORIDE: 2 INJECTION, SOLUTION EPIDURAL; INFILTRATION at 10:14

## 2023-01-11 RX ADMIN — SODIUM CHLORIDE, SODIUM LACTATE, POTASSIUM CHLORIDE, AND CALCIUM CHLORIDE 125 ML/HR: 600; 310; 30; 20 INJECTION, SOLUTION INTRAVENOUS at 05:51

## 2023-01-11 RX ADMIN — METOCLOPRAMIDE 10 MG: 5 INJECTION, SOLUTION INTRAMUSCULAR; INTRAVENOUS at 10:59

## 2023-01-11 RX ADMIN — IBUPROFEN 600 MG: 600 TABLET, FILM COATED ORAL at 15:02

## 2023-01-11 RX ADMIN — DIPHENHYDRAMINE HYDROCHLORIDE 25 MG: 50 INJECTION, SOLUTION INTRAMUSCULAR; INTRAVENOUS at 07:59

## 2023-01-11 RX ADMIN — DIPHENHYDRAMINE HYDROCHLORIDE 12.5 MG: 50 INJECTION, SOLUTION INTRAMUSCULAR; INTRAVENOUS at 01:01

## 2023-01-11 RX ADMIN — PROMETHAZINE HYDROCHLORIDE 12.5 MG: 25 INJECTION INTRAMUSCULAR; INTRAVENOUS at 04:07

## 2023-01-11 RX ADMIN — SODIUM CHLORIDE, SODIUM LACTATE, POTASSIUM CHLORIDE, AND CALCIUM CHLORIDE 999 ML/HR: 600; 310; 30; 20 INJECTION, SOLUTION INTRAVENOUS at 01:13

## 2023-01-11 RX ADMIN — GUAIFENESIN 600 MG: 600 TABLET, EXTENDED RELEASE ORAL at 01:00

## 2023-01-11 RX ADMIN — GUAIFENESIN 600 MG: 600 TABLET, EXTENDED RELEASE ORAL at 23:00

## 2023-01-11 NOTE — ANESTHESIA PROCEDURE NOTES
Epidural Block    Patient location during procedure: OB  Start time: 1/10/2023 7:30 PM  Reason for block: at surgeon's request and primary anesthetic  Staffing  Performed: Anesthesiologist   Anesthesiologist: Narinder Joshua MD  Preanesthetic Checklist  Completed: patient identified, IV checked, site marked, risks and benefits discussed, surgical consent, monitors and equipment checked, pre-op evaluation and timeout performed  Epidural  Patient position: sitting  Prep: Betadine  Patient monitoring: heart rate, continuous pulse ox and frequent blood pressure checks  Approach: midline  Location: lumbar  Injection technique: PARIS air  Needle  Needle type: Tuohy   Needle gauge: 18 G  Catheter type: side hole  Catheter size: 20 G  Catheter at skin depth: 11 cm  Catheter securement method: clear occlusive dressing  Test dose: negative  Assessment  Number of attempts: 1negative aspiration for CSF, negative aspiration for heme and no paresthesia on injection  patient tolerated the procedure well with no immediate complications

## 2023-01-11 NOTE — OB LABOR/OXYTOCIN SAFETY PROGRESS
Oxytocin Safety Progress Check Note - García Yoder 33 y o  female MRN: [id number]    Unit/Bed#: L&D 326-01 Encounter: [id number]    Dose (amrita-units/min) Oxytocin: 28 amrita-units/min  Contraction Frequency (minutes): 2  Contraction Quality: Strong  Tachysystole: No   Cervical Dilation: 10  Dilation Complete Date: 01/11/23  Dilation Complete Time: 0950  Cervical Effacement: 90  Fetal Station: 2  Baseline Rate: 165 bpm  Fetal Heart Rate: 145 BPM  FHR Category: Category I               Vital Signs:   Vitals:    01/11/23 0901   BP: 116/58   Pulse: 89   Resp:    Temp:    SpO2:        Notes/comments: pt feeling pressure/ good effort with pushing      Feliberto Aguirre DO 1/11/2023 9:59 AM

## 2023-01-11 NOTE — OB LABOR/OXYTOCIN SAFETY PROGRESS
Oxytocin Safety Progress Check Note - Manolo Arteaga 33 y o  female MRN: [id number]    Unit/Bed#: L&D 326-01 Encounter: [id number]    Dose (amrita-units/min) Oxytocin: 22 amrita-units/min (ok to restart per Dr Garcia Latin)  Contraction Frequency (minutes): 2  Contraction Quality: Strong  Tachysystole: No   Cervical Dilation: 3        Cervical Effacement: 80  Fetal Station: -1  Baseline Rate: 145 bpm  Fetal Heart Rate: 140 BPM  FHR Category: Category I               Vital Signs:   Vitals:    01/10/23 1942   BP: 136/75   Pulse: 88   Resp:    Temp:    SpO2:        Notes/comments:     comforable after epidural  Forebag ruptured for clear fluid/blood tinged  ? cervical cicatrix/ attempted to loosen with exam  Pt placed on peanut ball and now with occ early [address], DO 1/10/2023 8:39 PM

## 2023-01-11 NOTE — UTILIZATION REVIEW
2 Day Labor - Authorization Request    NOTIFICATION OF INPATIENT ADMISSION   MATERNITY/DELIVERY AUTHORIZATION REQUEST   SERVICING FACILITY:   [address] [place] [address] - L&D, , NICU  [address] [place] [address], [facility], [facility]  Tax ID: [id number]  NPI: [id number] ATTENDING PROVIDER:  Attending Name and NPI#: Renetta Hesham [[id number]]  Address: [address] [place] [address] [facility]  Phone: [phone]     ADMISSION INFORMATION:  Place of Service: Inpatient [address] [facility]  [address]  Place of Service Code: 21  Inpatient Admission Date/Time: 23  9:31 PM  Discharge Date/Time: No discharge date for patient encounter  Admitting Diagnosis Code/Description:  Encounter for elective induction of labor [Z34 90]     Mother: Damaris Pierson 1990 Estimated Date of Delivery: 23  Delivering clinician: Zack Calvin    OB History        1    Para        Term                AB        Living           SAB        IAB        Ectopic        Multiple        Live Births                   [place] Name & MRN:   Information for the patient's :  Suman Urrutia Girl Michael Littlejohn) [[id number]]      Delivery Information:  Sex: female  Delivered 2023 12:45 PM by Vaginal, Vacuum (Extractor); Gestational Age: 43w3d    [place] Measurements:  Weight: 8 lb 11 3 oz (3950 g); Height: 20 5"    APGAR 1 minute 5 minutes 10 minutes   Totals: 8 9      [place] Birth Information: 33 y o  female MRN: [id number] Unit/Bed#: L&D 326-01   Birthweight: No birth weight on file  Gestational Age: <None> Delivery Type:    APGARS Totals:        UTILIZATION REVIEW CONTACT:  [facility] Utilization   Network Utilization Review Department  Phone: [phone]  Fax [phone]  Email: [email or web address]  Contact for approvals/pending authorizations, clinical reviews, and discharge       PHYSICIAN ADVISORY SERVICES:  Medical Necessity Denial & Vlre-ep-Tcgo Review  Phone: [phone]  Fax: [phone]  Email: [email or web address]

## 2023-01-11 NOTE — OB LABOR/OXYTOCIN SAFETY PROGRESS
Oxytocin Safety Progress Check Note - Cee Alegria 33 y o  female MRN: [id number]    Unit/Bed#: L&D 326-01 Encounter: [id number]    Dose (amrita-units/min) Oxytocin: 22 amrita-units/min  Contraction Frequency (minutes): 2-3  Contraction Quality: Strong  Tachysystole: No   Cervical Dilation: 4-5        Cervical Effacement: 80  Fetal Station: -2  Baseline Rate: 155 bpm  Fetal Heart Rate: 140 BPM  FHR Category: Category II           Vital Signs:   Vitals:    01/11/23 0546   BP: 130/75   Pulse: 95   Resp:    Temp:    SpO2:        Notes/comments:   Pt is sleeping  SVE deferred  FHT cat 1 kaye q1-3 mins  Will continue titrating pitocin as able      Dr Yaya Tran aware     Lacie Tyler MD 1/11/2023 6:13 AM

## 2023-01-11 NOTE — PLAN OF CARE
Problem: PAIN - ADULT  Goal: Verbalizes/displays adequate comfort level or baseline comfort level  Description: Interventions:  - Encourage patient to monitor pain and request assistance  - Assess pain using appropriate pain scale  - Administer analgesics based on type and severity of pain and evaluate response  - Implement non-pharmacological measures as appropriate and evaluate response  - Consider cultural and social influences on pain and pain management  - Notify physician/advanced practitioner if interventions unsuccessful or patient reports new pain  Outcome: Progressing     Problem: INFECTION - ADULT  Goal: Absence or prevention of progression during hospitalization  Description: INTERVENTIONS:  - Assess and monitor for signs and symptoms of infection  - Monitor lab/diagnostic results  - Monitor all insertion sites, i e  indwelling lines, tubes, and drains  - Monitor endotracheal if appropriate and nasal secretions for changes in amount and color  - [place] appropriate cooling/warming therapies per order  - Administer medications as ordered  - Instruct and encourage patient and family to use good hand hygiene technique  - Identify and instruct in appropriate isolation precautions for identified infection/condition  Outcome: Progressing  Goal: Absence of fever/infection during neutropenic period  Description: INTERVENTIONS:  - Monitor WBC    Outcome: Progressing     Problem: SAFETY ADULT  Goal: Patient will remain free of falls  Description: INTERVENTIONS:  - Educate patient/family on patient safety including physical limitations  - Instruct patient to call for assistance with activity   - Consult OT/PT to assist with strengthening/mobility   - Keep Call bell within reach  - Keep bed low and locked with side rails adjusted as appropriate  - Keep care items and personal belongings within reach  - Initiate and maintain comfort rounds  - Make Fall Risk Sign visible to staff  - Apply yellow socks and bracelet for high fall risk patients  - Consider moving patient to room near nurses station  Outcome: Progressing  Goal: Maintain or return to baseline ADL function  Description: INTERVENTIONS:  -  Assess patient's ability to carry out ADLs; assess patient's baseline for ADL function and identify physical deficits which impact ability to perform ADLs (bathing, care of mouth/teeth, toileting, grooming, dressing, etc )  - Assess/evaluate cause of self-care deficits   - Assess range of motion  - Assess patient's mobility; develop plan if impaired  - Assess patient's need for assistive devices and provide as appropriate  - Encourage maximum independence but intervene and supervise when necessary  - Involve family in performance of ADLs  - Assess for home care needs following discharge   - Consider OT consult to assist with ADL evaluation and planning for discharge  - Provide patient education as appropriate  Outcome: Progressing  Goal: Maintains/Returns to pre admission functional level  Description: INTERVENTIONS:  - Perform BMAT or MOVE assessment daily    - Set and communicate daily mobility goal to care team and patient/family/caregiver  - Collaborate with rehabilitation services on mobility goals if consulted  - Out of bed for toileting  - Record patient progress and toleration of activity level   Outcome: Progressing     Problem: Knowledge Deficit  Goal: Patient/family/caregiver demonstrates understanding of disease process, treatment plan, medications, and discharge instructions  Description: Complete learning assessment and assess knowledge base    Interventions:  - Provide teaching at level of understanding  - Provide teaching via preferred learning methods  Outcome: Progressing     Problem: DISCHARGE PLANNING  Goal: Discharge to home or other facility with appropriate resources  Description: INTERVENTIONS:  - Identify barriers to discharge w/patient and caregiver  - Arrange for needed discharge resources and transportation as appropriate  - Identify discharge learning needs (meds, wound care, etc )  - Arrange for interpretive services to assist at discharge as needed  - Refer to Case Management Department for coordinating discharge planning if the patient needs post-hospital services based on physician/advanced practitioner order or complex needs related to functional status, cognitive ability, or social support system  Outcome: Progressing

## 2023-01-11 NOTE — OB LABOR/OXYTOCIN SAFETY PROGRESS
Oxytocin Safety Progress Check Note - Meg Sample 33 y o  female MRN: [id number]    Unit/Bed#: L&D 326-01 Encounter: [id number]    Dose (amrita-units/min) Oxytocin: 22 amrita-units/min (ok to restart per Dr Tonya Jean)  Contraction Frequency (minutes): 2  Contraction Quality: Strong  Tachysystole: No   Cervical Dilation: 3        Cervical Effacement: 80  Fetal Station: -2  Baseline Rate: 150 bpm  Fetal Heart Rate: 140 BPM  FHR Category: Category II               Vital Signs:  Vitals:    01/10/23 2139   BP:    Pulse:    Resp:    Temp: 98 5 °F (36 9 °C)   SpO2:        Notes/comments:   Occasional variables noted, IUPC inserted and pt repositioned to left side  Tracing improved  Will continue present management and start amnioinfusion if variables reoccur        Mayuri Gates DO 1/10/2023 9:40 PM

## 2023-01-11 NOTE — ANESTHESIA PREPROCEDURE EVALUATION
Procedure:  LABOR ANALGESIA    Relevant Problems   GYN   (+) 39 weeks gestation of pregnancy        Physical Exam    Airway    Mallampati score: II  TM Distance: >3 FB  Neck ROM: full     Dental       Cardiovascular  Rhythm: regular, Rate: normal,     Pulmonary  Comment: Bilateral chest rise, not using accessory muscles for breathing,     Other Findings        Anesthesia Plan  ASA Score- 2     Anesthesia Type- epidural with ASA Monitors  Additional Monitors:   Airway Plan:           Plan Factors-    Chart reviewed  Existing labs reviewed  Induction-     Postoperative Plan-     Informed Consent- Anesthetic plan and risks discussed with patient

## 2023-01-11 NOTE — OB LABOR/OXYTOCIN SAFETY PROGRESS
Oxytocin Safety Progress Check Note - Lisha Cox 33 y o  female MRN: [id number]    Unit/Bed#: L&D 326-01 Encounter: [id number]    Dose (amrita-units/min) Oxytocin: 22 amrita-units/min (ok to restart per Dr Valeri Wilder)  Contraction Frequency (minutes): 2-3  Contraction Quality: Strong  Tachysystole: No   Cervical Dilation: 4        Cervical Effacement: 80  Fetal Station: -2  Baseline Rate: 155 bpm  Fetal Heart Rate: 140 BPM  FHR Category: Category II           Vital Signs:   Vitals:    01/11/23 0301   BP: 123/70   Pulse: 88   Resp:    Temp:    SpO2:        Notes/comments:   Pt is resting comfortably  SVE as above  FHT cat 1 kaye q3-4 mins  Will continue titrating pitocin at this time      Dr John Sultana aware    Deloris Crockett MD 1/11/2023 3:47 AM

## 2023-01-11 NOTE — UTILIZATION REVIEW
Initial Clinical Review    Admission: Date/Time/Statement:   Admission Orders (From admission, onward)     Ordered        231  Inpatient Admission  Once                      Orders Placed This Encounter   Procedures   • Inpatient Admission     Standing Status:   Standing     Number of Occurrences:   1     Order Specific Question:   Level of Care     Answer:   Med Surg [16]     Order Specific Question:   Estimated length of stay     Answer:   More than 2 Midnights     Order Specific Question:   Certification     Answer:   I certify that inpatient services are medically necessary for this patient for a duration of greater than two midnights  See H&P and MD Progress Notes for additional information about the patient's course of treatment  ED Arrival Information     Patient not seen in ED                     Chief Complaint   Patient presents with   • Scheduled Induction       Initial Presentation: 33 y o  female  PMH  at 39w2d Inpatient admission due to [place] by JOSE, KRISTAL 0/0/-3, occasional cntx on TOCO  PLAN continuous fetal monitoring, IVF;  IOL incl  initally w Cytotec, repeated Day balloon insertion , IV Pit titration   2023 VAVD @ 1245 for viable Baby Girl apgars 8 & 9; BW= 3950 g (8 lb 11 3 oz)    Date:  1/10/2023   Day 2:   @1:08 AM Membrane sweeping done after patient consent, Contraction Frequency (minutes): 2-3  Contraction Quality: Mild  Tachysystole: No   Cervical Dilation: 1  Cervical Effacement: 0  Fetal Station: -3  @ 5:22 AM #2 Day insertion,   Contraction Frequency (minutes): 3-4  Contraction Quality: Moderate  Tachysystole: No   Cervical Dilation: 1  Cervical Effacement: 0  Fetal Station: -3  @9:52 AM start pitocin titration  @12:23 PM  SVE  2/60/-3, Oxytocin: 8 amrita-units/min  Contraction Frequency (minutes): 3-3 5  Contraction Quality: Moderate  Tachysystole: No   @ 5:53PM SROM for clear Dose (amrita-units/min) Oxytocin: 22 amrita-units/min  Contraction Frequency (minutes): 2-3  Contraction Quality: Moderate  Tachysystole: No   Cervical Dilation: 2  Cervical Effacement: 80  Fetal Station: -2  Epidural  1/10/2023 7:30 PM    DATE  1/11/2023  DAY # 3  @ 12:44 AM  Oxytocin: 22 amrita-units/min late decels w resolution w positioning R side  Contraction Frequency (minutes): 1 5-2  Contraction Quality: Strong  Tachysystole: No   Cervical Dilation: 3  Cervical Effacement: 80  Fetal Station: -2   @ 7:42 AM  Oxytocin: 26 amrita-units/min  Contraction Frequency (minutes): 2-3  Contraction Quality: Strong  Tachysystole: No   Cervical Dilation: 9  Cervical Effacement: 90  Fetal Station: 2  @ 9:50 AM Dose (amrita-units/min) Oxytocin: 28 amrita-units/min  Contraction Frequency (minutes): 2  Contraction Quality: Strong  Tachysystole: No   Cervical Dilation: 10  Dilation Complete Date: 01/11/23  Dilation Complete Time: 0950  Cervical Effacement: 90  Fetal Station: 2     VAVD @ 1245 for viable Baby Girl apgars 8 & 9; BW= 3950 g (8 lb 11 3 oz)     Triage Vitals   Temperature Pulse Respirations Blood Pressure SpO2   01/09/23 2107 01/09/23 2107 01/09/23 2107 01/09/23 2107 01/10/23 1930   98 2 °F (36 8 °C) 88 16 117/81 98 %      Temp Source Heart Rate Source Patient Position - Orthostatic VS BP Location FiO2 (%)   01/09/23 2107 01/09/23 2107 -- -- --   Oral Monitor         Pain Score       01/09/23 2107       No Pain          Wt Readings from Last 1 Encounters:   01/09/23 69 kg (152 lb 3 2 oz)     Additional Vital Signs:   Date/Time Temp Pulse Resp BP SpO2   01/11/23 1131 -- 118 Abnormal  -- 153/73 --   01/11/23 1116 98 3 °F (36 8 °C) -- -- -- --   01/11/23 1101 -- 93 -- 137/76 --   01/11/23 1031 -- 88 -- 131/60 --   01/11/23 1016 -- 115 Abnormal  -- 115/68         01/10/23 0730 98 3 °F (36 8 °C) -- -- -- --   01/10/23 0703 -- 83 -- 103/64 --   01/10/23 0603 -- 65 -- 118/79 --   01/10/23 0504 98 2 °F (36 8 °C) 74 -- 130/79 --   01/10/23 0406 -- 62 -- 111/75 --   01/10/23 0303 97 9 °F (36 6 °C) 70 -- 114/68 --   01/09/23 2317 98 °F (36 7 °C) 76 -- 118/73 --   01/09/23 2301 -- 78 -- 117/72 --   01/09/23 2245 -- 77 -- 104/68 --   01/09/23 2231 -- 78 -- 109/70 --   01/09/23 2107 98 2 °F (36 8 °C) 88 16 117/81 --     Weights (last 14 days)    Date/Time Weight Height   01/09/23 2107 -- 5' (1 524 m)   01/09/23 2057 69 kg (152 lb 3 2 oz) --       Pertinent Labs/Diagnostic Test Results:   No orders to display     Results from last 7 days   Lab Units 01/10/23  1434   SARS-COV-2  Negative     Results from last 7 days   Lab Units 01/09/23  2145   WBC Thousand/uL 9 73   HEMOGLOBIN g/dL 10 8*   HEMATOCRIT % 32 4*   PLATELETS Thousands/uL 200             Results from last 7 days   Lab Units 01/10/23  1434   INFLUENZA A PCR  Negative   INFLUENZA B PCR  Negative   RSV PCR  Negative            ED Treatment:   Medication Administration - No Administrations Displayed (No Start Event Found)     None        No past medical history on file  Present on Admission:  **None**      Admitting Diagnosis: Encounter for elective induction of labor [Z34 90]  Age/Sex: 33 y o  female  Admission Orders:  Continuous external uterine contraction & fetal monitoring    Scheduled Medications:  carboprost, , ,   guaiFENesin, 600 mg, Oral, Q12H SUPRIYA  methylergonovine, , ,   miSOPROStol, 25 mcg, Vaginal, Once      Continuous IV Infusions:  lactated ringers, 125 mL/hr, Intravenous, Continuous  oxytocin, 1-30 amrita-units/min, Intravenous, Titrated  ropivacaine 0 2%, , Epidural, Continuous      PRN Meds:  acetaminophen, 650 mg, Oral, Q6H PRN  bupivacaine (PF), 30 mL, Infiltration, Once PRN  diphenhydrAMINE, 25 mg, Intravenous, Q6H PRN  metoclopramide, 10 mg, Intravenous, Q6H PRN  ondansetron, 4 mg, Intravenous, Q6H PRN        IP CONSULT TO ANESTHESIOLOGY    Network Utilization Review Department  ATTENTION: Please call with any questions or concerns to [phone] and carefully listen to the prompts so that you are directed to the right person   All voicemails are confidential   Leonard Marin all requests for admission clinical reviews, approved or denied determinations and any other requests to dedicated fax number below belonging to the campus where the patient is receiving treatment   List of dedicated fax numbers for the Facilities:  [address] DENIALS (Administrative/Medical Necessity) [phone]   [address] (Maternity/NICU/Pediatrics) [phone]   914 Alisha Almanza [phone]   [facility]  [phone]   [address] [address] Selma Das [facility] 28 [phone]   155 [facility] 134 815 [facility] [phone]

## 2023-01-11 NOTE — OB LABOR/OXYTOCIN SAFETY PROGRESS
Oxytocin Safety Progress Check Note - Meg Sample 33 y o  female MRN: [id number]    Unit/Bed#: L&D 326-01 Encounter: [id number]    Dose (amrita-units/min) Oxytocin: 22 amrita-units/min (ok to restart per Dr Tonya Jean)  Contraction Frequency (minutes): 1 5-2  Contraction Quality: Strong  Tachysystole: No   Cervical Dilation: 3        Cervical Effacement: 80  Fetal Station: -2  Baseline Rate: 155 bpm  Fetal Heart Rate: 140 BPM  FHR Category: Category II           Vital Signs:   Vitals:    01/11/23 0031   BP: 134/81   Pulse: (!) 117   Resp:    Temp:    SpO2:        Notes/comments:   Pt is resting comfortably  She reports she is tired and would like something for her congestion  SVE as above and unchanged FHT intermittently cat 2 with late decelerations  Patient repositioned on right side with resolution of late decelerations  Will continue titrating pitocin as able      Dr Anika Henry aware     Se Bee MD 1/11/2023 12:44 AM

## 2023-01-11 NOTE — L&D DELIVERY NOTE
DELIVERY NOTE  Charissa Jacome 33 y o  female MRN: [id number]  Unit/Bed#: L&D 326-01 Encounter: [id number]    Obstetrician:   Lakhwinder Cueto    Assistant: ne resident available    Pre-Delivery Diagnosis: Term pregnancy  Induced labor  Single fetus  Pregnancy complicated by: prolonged second stage    Post-Delivery Diagnosis: Same as above - Delivered  Viable female fetus  2nd degree laceration  Vacuum assisted vaginal delivery    Procedure: Outlet vacuum assisted vaginal delivery  Repair of 2nd degree spontaneous laceration      Indications for instrumental delivery: Prolonged second stage of labor  Shortening of the second stage for maternal benefit, maternal exhaustion    Anesthesia: Epidural    Estimated Blood Loss: 724YW           Complications:  None  Prolonged second stage    Brief History and Labor Course:     34 yo  at 39w2D presented for elective induction and suspicion of macrosomia  Cervix was ripened with cytotec and garg balloon overnight  Pitocin was started after the garg came out  Pt had a cervical cicatrix from previous LEEP which was released after several membrane sweeps done after pt received epidural   Pt progressed from 5cm to 9cm within an hour and then at 0945 felt urge to push and was complete/+2-3  Description of Delivery:   After pushing for 2 hrs 45 minutes, at 1230  it was noted patient had maternal exhaustion and prolonged second stage    It was decided to proceed with vacuum extractor delivery  All risks, benefits, and alternatives were discussed with the patient  Risks included, but were not limited to, bleeding, infection, injury to the vagina, cervix, urethra, bladder, rectum, and perineum  Patient was also counseled on fetal risks including scalp laceration, bruising, subgaleal hematoma, cephalohematoma, intracranial hematoma,  jaundice, and shoulder dystocia   Patient expressed understanding of the risks and had no questions at this time    Patients bladder was emptied with garg catheter, which was removed just prior to start of the procedure  She had adequate anesthesia with epidural and was in dorsal lithotomy position  Patient was examined and  was noted to be +4 station Vacuum extractor cup was applied at 1235 over the median flexion point and centered over the sagittal sutures 3cm anterior to the posterior fontanelle  The cup was checked to ensure no maternal tissue was within the cup margins  At the start of the next ctxn, the suction was applied to 500 mmHg  Gentle traction was applied and advancement of the fetal head was noted  There was a total of 0 pop off and 2 pulls  The cup was then released upon  of the fetal head  Patient delivered a viable Female  over a 2nd degree laceration  A nuchal cord was not noted   With the assistance of maternal expulsive efforts and downward traction of fetal head, the anterior shoulder was delivered without difficulty, followed by the remainder of the infant's body  The infant was placed on the mother's abdomen and the umbilical cord was doubly clamped and cut  The awaiting nurse resuscitator was there for further evaluation of the infant  Umbilical cord blood and umbilical artery and venous gases were collected  IV pitocin was started during the 3rd stage to control bleeding  Placenta was delivered with fundal massage and gentle traction on the cord with active management of the third stage of labor  Placenta delivered intact with a centrally  inserted 3-vessel cord  Bleeding was noted to be under control  Inspection of the perineum, vagina, labia, and urethra revealed none and 2nd degree which was then repaired in standard fashion with 3-0 Vicryl  The lacerations showed good tissue reapproximation and hemostasis  Mother and baby are currently recovering nicely in stable condition  I was present and participated in all key portions of the procedure    All needle, sponge and instrument counts were noted to be correct x2

## 2023-01-11 NOTE — OB LABOR/OXYTOCIN SAFETY PROGRESS
Oxytocin Safety Progress Check Note - Brenda Schultz 33 y o  female MRN: [id number]    Unit/Bed#: L&D 326-01 Encounter: [id number]    Dose (amrita-units/min) Oxytocin: 22 amrita-units/min  Contraction Frequency (minutes): 2-2 5  Contraction Quality: Strong  Tachysystole: No   Cervical Dilation: 5        Cervical Effacement: 80  Fetal Station: -2  Baseline Rate: 155 bpm  Fetal Heart Rate: 140 BPM  FHR Category: Category II           Vital Signs:   Vitals:    01/11/23 0546   BP: 130/75   Pulse: 95   Resp:    Temp:    SpO2:        Notes/comments:   Pt is feeling nauseous and requesting to be checked  SVE as above  FHT cat 1 kaye q1-3 mins  Will continue titrating pitocin at this time  Zofran for nausea      Dr Traci Gonsales MD 1/11/2023 6:24 AM

## 2023-01-11 NOTE — OB LABOR/OXYTOCIN SAFETY PROGRESS
Oxytocin Safety Progress Check Note - Joellen Fail 33 y o  female MRN: [id number]    Unit/Bed#: L&D 326-01 Encounter: [id number]    Dose (amrita-units/min) Oxytocin: 26 amrita-units/min  Contraction Frequency (minutes): 2-3  Contraction Quality: Strong  Tachysystole: No   Cervical Dilation: 9        Cervical Effacement: 90  Fetal Station: 2  Baseline Rate: 165 bpm  Fetal Heart Rate: 150 BPM  FHR Category: Category I               Vital Signs:   Vitals:    23 0715   BP: 109/63   Pulse: 97   Resp:    Temp: 99 °F (37 2 °C)   SpO2:        Notes/comments: Feeling some pressure  Anticipate         Juan M Liz DO 2023 7:42 AM

## 2023-01-11 NOTE — DISCHARGE SUMMARY
Obstetrics Discharge Summary  Jael Luz 33 y o  female MRN: [id number]  Unit/Bed#: L&D 326-01 Encounter: [id number]    Admission Date: 2023     Discharge Date: 2023    Admitting Attending: Joana  Delivery Attending: Caro Gonzalez  Discharging Attending: Dr Caro Gonzalez    Admitting Diagnoses:   Pregnancy at 39w  Tobacco use outside of pregnancy    Discharge Diagnoses:   Same, delivered      Procedures: vacuum, outlet    Anesthesia: epidural    Hospital course: Jael Luz is now a 33 y o   who was initially admitted at 39w2d for elective induction of labor  A garg balloon was placed, and she received a dose of cytotec  She was started on pitocin  An amniotomy was performed for clear fluid  She progressed to complete cervical dilation and began pushing  On 23 she delivered a viable female  39w4d via normal spontaneous vaginal delivery  She sustained a 2nd laceration during delivery which was adequately repaired  's birth weight was8 lb 11 3 oz; Apgars were 8 (1 min) and 9 (5 min)   was admitted to the  nursery  Patient tolerated the procedure well  Her post-delivery course was uncomplicated  Her postpartum pain was well controlled with oral analgesics  Maternal blood type is A positive so RhoGAM was not indicated  On day of discharge, she was ambulating and able to reasonably perform all ADLs  She was voiding and had appropriate bowel function  Pain was well controlled  She was discharged home on postpartum day #1 without complications  Patient was instructed to follow up with her OBGYN as an outpatient and was given appropriate warnings to call provider if she develops signs of infection or uncontrolled pain  Complications: none apparent    Condition at discharge: good     Provisions for Follow-Up Care:  Please see after visit summary for information related to follow-up care and any pertinent home health orders        Disposition: Home    Planned Readmission: No    Discharge Medications:   Please see AVS for a complete list of discharge medications  Discharge instructions :   Please see AVS for complete discharge instructions      Mehrdad Jacome MD  OBGYN PGY-1

## 2023-01-11 NOTE — ANESTHESIA POSTPROCEDURE EVALUATION
Post-Op Assessment Note    CV Status:  Stable    Pain management: adequate     Mental Status:  Alert and awake   Hydration Status:  Euvolemic   PONV Controlled:  Controlled   Airway Patency:  Patent      Post Op Vitals Reviewed: Yes      Staff: Anesthesiologist     Post-op block assessment: catheter intact and no complications      No notable events documented      BP      Temp      Pulse     Resp      SpO2      /58   Pulse 86   Temp 98 8 °F (37 1 °C) (Oral)   Resp 16   Ht 5' (1 524 m)   Wt 69 kg (152 lb 3 2 oz)   LMP 04/09/2022 (Exact Date)   SpO2 96%   BMI 29 72 kg/m²

## 2023-01-12 PROBLEM — Z87.59 STATUS POST VACUUM-ASSISTED VAGINAL DELIVERY: Status: ACTIVE | Noted: 2023-01-12

## 2023-01-12 RX ADMIN — ACETAMINOPHEN 650 MG: 325 TABLET ORAL at 10:30

## 2023-01-12 RX ADMIN — IBUPROFEN 600 MG: 600 TABLET, FILM COATED ORAL at 11:50

## 2023-01-12 RX ADMIN — ACETAMINOPHEN 650 MG: 325 TABLET ORAL at 19:26

## 2023-01-12 RX ADMIN — IBUPROFEN 600 MG: 600 TABLET, FILM COATED ORAL at 05:34

## 2023-01-12 RX ADMIN — DOCUSATE SODIUM 100 MG: 100 CAPSULE, LIQUID FILLED ORAL at 17:46

## 2023-01-12 RX ADMIN — ACETAMINOPHEN 650 MG: 325 TABLET ORAL at 15:10

## 2023-01-12 RX ADMIN — GUAIFENESIN 600 MG: 600 TABLET, EXTENDED RELEASE ORAL at 08:50

## 2023-01-12 RX ADMIN — IBUPROFEN 600 MG: 600 TABLET, FILM COATED ORAL at 17:47

## 2023-01-12 RX ADMIN — DOCUSATE SODIUM 100 MG: 100 CAPSULE, LIQUID FILLED ORAL at 08:50

## 2023-01-12 NOTE — PROGRESS NOTES
CM informed pt will need post op transport home due to Anesthesia  Pt is paraplegic and has his motorized wheelchair here  Per Cecilio Aceves, Dylan Willingham, and Ja, unable to transport pt in wheelchair or ambulette along with his motorized wheelchair  The motorized wheelchairs cannot be safely secured  Progress Note - OB/GYN  Joellen Rivas 33 y o  female MRN: [id number]  Unit/Bed#: L&D 312-01 Encounter: [id number]    Assessment and Plan     Joellen Rivas is a patient of: [facility]  She is PPD# 1 s/p VAVD  Recovering well and is stable       * Status post vacuum-assisted vaginal delivery  Assessment & Plan  Lochia WNL   Recovering well   Appropriate bowel and bladder function   Pain well controlled   Tolerating diet   Breastfeeding: yes  Ambulating without issues   No lower extremity tenderness  GBS negative    Rh positive     Former smoker  Assessment & Plan  Quit after finding out she was pregnant      Disposition    - Anticipate discharge home on PPD# 1      Subjective/Objective     Chief Complaint: Postpartum State     Subjective:    Joellen Rivas is PPD#1 s/p VAVD  She has no current complaints  Pain is well controlled  Patient is currently voiding  She is ambulating  Patient is currently passing flatus and has had no bowel movement  She is tolerating PO, and denies nausea or vomitting  Patient denies fever, chills, chest pain, shortness of breath, or calf tenderness  Lochia is normal  She is  Breastfeeding  She is recovering well and is stable         Vitals:   /69 (BP Location: Left arm)   Pulse 62   Temp 97 5 °F (36 4 °C) (Oral)   Resp 18   Ht 5' (1 524 m)   Wt 69 kg (152 lb 3 2 oz)   LMP 04/09/2022 (Exact Date)   SpO2 97%   Breastfeeding Yes   BMI 29 72 kg/m²       Intake/Output Summary (Last 24 hours) at 1/12/2023 2609  Last data filed at 1/11/2023 8118  Gross per 24 hour   Intake 6407 27 ml   Output 1300 ml   Net 5107 27 ml       Invasive Devices     Peripheral Intravenous Line  Duration           Peripheral IV 01/09/23 Right Hand 2 days                Physical Exam:   GEN: Joellen Rivas appears well, alert and oriented x 3, pleasant and cooperative   CARDIO: RRR, no murmurs or rubs  RESP:  CTAB, no wheezes or rales  ABDOMEN: soft, no tenderness, no distention, fundus @ 2cm below u , Incision C/D/I  EXTREMITIES:  non tender, no erythema      Labs:     Hemoglobin   Date Value Ref Range Status   01/09/2023 10 8 (L) 11 5 - 15 4 g/dL Final   07/05/2022 13 1 11 5 - 15 4 g/dL Final     WBC   Date Value Ref Range Status   01/09/2023 9 73 4 31 - 10 16 Thousand/uL Final   07/05/2022 9 03 4 31 - 10 16 Thousand/uL Final     Platelets   Date Value Ref Range Status   01/09/2023 200 149 - 390 Thousands/uL Final   07/05/2022 258 149 - 390 Thousands/uL Final          Yuko Mary Wharton MD  1/12/2023  6:09 AM

## 2023-01-12 NOTE — PLAN OF CARE
Problem: PAIN - ADULT  Goal: Verbalizes/displays adequate comfort level or baseline comfort level  Description: Interventions:  - Encourage patient to monitor pain and request assistance  - Assess pain using appropriate pain scale  - Administer analgesics based on type and severity of pain and evaluate response  - Implement non-pharmacological measures as appropriate and evaluate response  - Consider cultural and social influences on pain and pain management  - Notify physician/advanced practitioner if interventions unsuccessful or patient reports new pain  Outcome: Progressing     Problem: INFECTION - ADULT  Goal: Absence or prevention of progression during hospitalization  Description: INTERVENTIONS:  - Assess and monitor for signs and symptoms of infection  - Monitor lab/diagnostic results  - Monitor all insertion sites, i e  indwelling lines, tubes, and drains  - Monitor endotracheal if appropriate and nasal secretions for changes in amount and color  - [place] appropriate cooling/warming therapies per order  - Administer medications as ordered  - Instruct and encourage patient and family to use good hand hygiene technique  - Identify and instruct in appropriate isolation precautions for identified infection/condition  Outcome: Progressing  Goal: Absence of fever/infection during neutropenic period  Description: INTERVENTIONS:  - Monitor WBC    Outcome: Progressing     Problem: SAFETY ADULT  Goal: Patient will remain free of falls  Description: INTERVENTIONS:  - Educate patient/family on patient safety including physical limitations  - Instruct patient to call for assistance with activity   - Consult OT/PT to assist with strengthening/mobility   - Keep Call bell within reach  - Keep bed low and locked with side rails adjusted as appropriate  - Keep care items and personal belongings within reach  - Initiate and maintain comfort rounds  - Make Fall Risk Sign visible to staff    - Apply yellow socks and bracelet for high fall risk patients  - Consider moving patient to room near nurses station  Outcome: Progressing  Goal: Maintain or return to baseline ADL function  Description: INTERVENTIONS:  -  Assess patient's ability to carry out ADLs; assess patient's baseline for ADL function and identify physical deficits which impact ability to perform ADLs (bathing, care of mouth/teeth, toileting, grooming, dressing, etc )  - Assess/evaluate cause of self-care deficits   - Assess range of motion  - Assess patient's mobility; develop plan if impaired  - Assess patient's need for assistive devices and provide as appropriate  - Encourage maximum independence but intervene and supervise when necessary  - Involve family in performance of ADLs  - Assess for home care needs following discharge   - Consider OT consult to assist with ADL evaluation and planning for discharge  - Provide patient education as appropriate  Outcome: Progressing  Goal: Maintains/Returns to pre admission functional level  Description: INTERVENTIONS:  - Perform BMAT or MOVE assessment daily    - Set and communicate daily mobility goal to care team and patient/family/caregiver  - Collaborate with rehabilitation services on mobility goals if consulted  - Out of bed for toileting  - Record patient progress and toleration of activity level   Outcome: Progressing     Problem: Knowledge Deficit  Goal: Patient/family/caregiver demonstrates understanding of disease process, treatment plan, medications, and discharge instructions  Description: Complete learning assessment and assess knowledge base    Interventions:  - Provide teaching at level of understanding  - Provide teaching via preferred learning methods  Outcome: Progressing     Problem: DISCHARGE PLANNING  Goal: Discharge to home or other facility with appropriate resources  Description: INTERVENTIONS:  - Identify barriers to discharge w/patient and caregiver  - Arrange for needed discharge resources and transportation as appropriate  - Identify discharge learning needs (meds, wound care, etc )  - Arrange for interpretive services to assist at discharge as needed  - Refer to Case Management Department for coordinating discharge planning if the patient needs post-hospital services based on physician/advanced practitioner order or complex needs related to functional status, cognitive ability, or social support system  Outcome: Progressing     Problem: POSTPARTUM  Goal: Experiences normal postpartum course  Description: INTERVENTIONS:  - Monitor maternal vital signs  - Assess uterine involution and lochia  Outcome: Progressing  Goal: Appropriate maternal -  bonding  Description: INTERVENTIONS:  - Identify family support  - Assess for appropriate maternal/infant bonding   -Encourage maternal/infant bonding opportunities  - Referral to  or  as needed  Outcome: Progressing  Goal: Establishment of infant feeding pattern  Description: INTERVENTIONS:  - Assess breast/bottle feeding  - Refer to lactation as needed  Outcome: Progressing  Goal: Incision(s), wounds(s) or drain site(s) healing without S/S of infection  Description: INTERVENTIONS  - Assess and document dressing, incision, wound bed, drain sites and surrounding tissue  - Provide patient and family education  Outcome: Progressing

## 2023-01-12 NOTE — LACTATION NOTE
This note was copied from a baby's chart  CONSULT - LACTATION  Baby Girl Maribell Arteaga 1 days female MRN: [id number]    [facility] Room / Bed: L&D 312(N)/L&D 312(N) Encounter: [id number]    Maternal Information     MOTHER:  Elina Carbajal  Maternal Age: 33 y o    OB History: # 1 - Date: 23, Sex: Female, Weight: 3950 g (8 lb 11 3 oz), GA: 39w4d, Delivery: Vaginal, Vacuum (Extractor), Apgar1: 8, Apgar5: 9, Living: Living, Birth Comments: None   Previouse breast reduction surgery?  No    Lactation history:   Has patient previously breast fed: No   How long had patient previously breast fed:     Previous breast feeding complications:       Past Surgical History:   Procedure Laterality Date   • HYMENECTOMY     • AR CONIZATION CERVIX W/WO D&C RPR ELTRD EXC N/A 2017    Procedure: BIOPSY LEEP CERVIX;  Surgeon: Maude Hansen DO;  Location: [facility];  Service: Gynecology   • TONSILLECTOMY          Birth information:  YOB: 2022   Time of birth: 12:45 PM   Sex: female   Delivery type: Vaginal, Vacuum (Extractor)   Birth Weight: 3950 g (8 lb 11 3 oz)   Percent of Weight Change: -2%     Gestational Age: 43w3d   [unfilled]    Assessment     Breast and nipple assessment: normal assessment     Assessment: restricted tongue movement    Feeding assessment: latch difficulty (Need guidence for deep latch using football hold)     LATCH:  Latch: Grasps breast, tongue down, lips flanged, rhythmic sucking (A few attempts till baby latched & suckling well)   Audible Swallowing: A few with stimulation   Type of Nipple: Everted (After stimulation)   Comfort (Breast/Nipple): Filling, red/small blisters/bruises, mild/moderate discomfort (some soreness on left nipple)   Hold (Positioning): Partial assist, teach one side, mother does other, staff holds   LATCH Score: 7          Feeding recommendations:  breast feed on demand     Met with mother & father earlier around 57  Provided  with Ready, Set, Baby booklet which contained information on:  Hand expression with access to QR codes to review hand expression  Positioning and latch reviewed as well as showing images of other feeding positions  Discussed the properties of a good latch in any position  Feeding on cue and what that means for recognizing infant's hunger, s/s that baby is getting enough milk and some s/s that breastfeeding dyad may need further help      Skin to Skin contact an benefits to mom and baby  Avoidance of pacifiers for the first month discussed  Gave information on common concerns, what to expect the first few weeks after delivery, preparing for other caregivers, and how partners can help  Resources for support also provided  Also reviewed discharge breastfeeding booklet including the feeding log  Emphasized 8 or more (12) feedings in a 24 hour period, what to expect for the number of diapers per day of life and the progression of properties of the  stooling pattern  Reviewed breastfeeding and your lifestyle, storage and preparation of breast milk, how to keep you breast pump clean, the employed breastfeeding mother and paced bottle feeding handouts  Booklet included Breastfeeding Resources for after discharge including access to the number for the [address]  Mom called in now to help wake baby and provide deep latch  Mom chose left breast to start using football hold Worked on positioning infant up at chest level and starting to feed infant with nose arriving at the nipple  Then, using areolar compression to achieve a deep latch that is comfortable and exchanges optimum amounts of milk  Baby with short bursts  of piston suck for a few latches  Mom states baby nurses better on left breast  Baby moved to left breast also using football hold  Baby quickly guided to deep latch  Stimulated to suck converting to rocker sucks   Nursing well with stimulation till popped off  Burped  Baby remains with hunger cues  Placed back at left breast using football hold  This time latching quickly and converting to rocker suckling  Mom left to maintain latch and gain confidence with breastfeeding  Dad at bedside  Parents note better suckling and seem pleased with session  Encouraged parents to call for assistance, questions, and concerns about breastfeeding  Extension provided            Jerald Jett RN 1/12/2023 5:02 PM

## 2023-01-12 NOTE — ASSESSMENT & PLAN NOTE
-prolonged second stage of labor  -spontaneous second degree laceration with suture repair  -lochia WNL   -recovering well   -pain well controlled   -breastfeeding: yes  -ambulating without issues   -GBS negative    -Rh positive

## 2023-01-12 NOTE — PLAN OF CARE
Problem: PAIN - ADULT  Goal: Verbalizes/displays adequate comfort level or baseline comfort level  Description: Interventions:  - Encourage patient to monitor pain and request assistance  - Assess pain using appropriate pain scale  - Administer analgesics based on type and severity of pain and evaluate response  - Implement non-pharmacological measures as appropriate and evaluate response  - Consider cultural and social influences on pain and pain management  - Notify physician/advanced practitioner if interventions unsuccessful or patient reports new pain  Outcome: Progressing     Problem: INFECTION - ADULT  Goal: Absence or prevention of progression during hospitalization  Description: INTERVENTIONS:  - Assess and monitor for signs and symptoms of infection  - Monitor lab/diagnostic results  - Monitor all insertion sites, i e  indwelling lines, tubes, and drains  - Monitor endotracheal if appropriate and nasal secretions for changes in amount and color  - [place] appropriate cooling/warming therapies per order  - Administer medications as ordered  - Instruct and encourage patient and family to use good hand hygiene technique  - Identify and instruct in appropriate isolation precautions for identified infection/condition  Outcome: Progressing  Goal: Absence of fever/infection during neutropenic period  Description: INTERVENTIONS:  - Monitor WBC    Outcome: Progressing     Problem: SAFETY ADULT  Goal: Patient will remain free of falls  Description: INTERVENTIONS:  - Educate patient/family on patient safety including physical limitations  - Instruct patient to call for assistance with activity   - Consult OT/PT to assist with strengthening/mobility   - Keep Call bell within reach  - Keep bed low and locked with side rails adjusted as appropriate  - Keep care items and personal belongings within reach  - Initiate and maintain comfort rounds  - Make Fall Risk Sign visible to staff  - - Consider moving patient to room near nurses station  Outcome: Progressing  Goal: Maintain or return to baseline ADL function  Description: INTERVENTIONS:  -  Assess patient's ability to carry out ADLs; assess patient's baseline for ADL function and identify physical deficits which impact ability to perform ADLs (bathing, care of mouth/teeth, toileting, grooming, dressing, etc )  - Assess/evaluate cause of self-care deficits   - Assess range of motion  - Assess patient's mobility; develop plan if impaired  - Assess patient's need for assistive devices and provide as appropriate  - Encourage maximum independence but intervene and supervise when necessary  - Involve family in performance of ADLs  - Assess for home care needs following discharge   - Consider OT consult to assist with ADL evaluation and planning for discharge  - Provide patient education as appropriate  Outcome: Progressing  Goal: Maintains/Returns to pre admission functional level  Description: INTERVENTIONS:  - Perform BMAT or MOVE assessment daily    - Set and communicate daily mobility goal to care team and patient/family/caregiver  - Collaborate with rehabilitation services on mobility goals if consulted  -- Out of bed for toileting  - Record patient progress and toleration of activity level   Outcome: Progressing     Problem: Knowledge Deficit  Goal: Patient/family/caregiver demonstrates understanding of disease process, treatment plan, medications, and discharge instructions  Description: Complete learning assessment and assess knowledge base    Interventions:  - Provide teaching at level of understanding  - Provide teaching via preferred learning methods  Outcome: Progressing     Problem: DISCHARGE PLANNING  Goal: Discharge to home or other facility with appropriate resources  Description: INTERVENTIONS:  - Identify barriers to discharge w/patient and caregiver  - Arrange for needed discharge resources and transportation as appropriate  - Identify discharge learning needs (meds, wound care, etc )  - Arrange for interpretive services to assist at discharge as needed  - Refer to Case Management Department for coordinating discharge planning if the patient needs post-hospital services based on physician/advanced practitioner order or complex needs related to functional status, cognitive ability, or social support system  Outcome: Progressing     Problem: POSTPARTUM  Goal: Experiences normal postpartum course  Description: INTERVENTIONS:  - Monitor maternal vital signs  - Assess uterine involution and lochia  Outcome: Progressing  Goal: Appropriate maternal -  bonding  Description: INTERVENTIONS:  - Identify family support  - Assess for appropriate maternal/infant bonding   -Encourage maternal/infant bonding opportunities  - Referral to  or  as needed  Outcome: Progressing  Goal: Establishment of infant feeding pattern  Description: INTERVENTIONS:  - Assess breast/bottle feeding  - Refer to lactation as needed  Outcome: Progressing  Goal: Incision(s), wounds(s) or drain site(s) healing without S/S of infection  Description: INTERVENTIONS  - Assess and document dressing, incision, wound bed, drain sites and surrounding tissue  - Provide patient and family education  Outcome: Progressing

## 2023-01-13 VITALS
WEIGHT: 152.2 LBS | OXYGEN SATURATION: 99 % | HEART RATE: 79 BPM | RESPIRATION RATE: 16 BRPM | DIASTOLIC BLOOD PRESSURE: 59 MMHG | HEIGHT: 60 IN | SYSTOLIC BLOOD PRESSURE: 101 MMHG | TEMPERATURE: 97.9 F | BODY MASS INDEX: 29.88 KG/M2

## 2023-01-13 RX ORDER — IBUPROFEN 600 MG/1
600 TABLET ORAL EVERY 6 HOURS
Qty: 30 TABLET | Refills: 0
Start: 2023-01-13

## 2023-01-13 RX ORDER — ACETAMINOPHEN 325 MG/1
650 TABLET ORAL EVERY 4 HOURS PRN
Refills: 0
Start: 2023-01-13

## 2023-01-13 RX ORDER — DOCUSATE SODIUM 100 MG/1
100 CAPSULE, LIQUID FILLED ORAL 2 TIMES DAILY
Refills: 0
Start: 2023-01-13

## 2023-01-13 RX ORDER — GUAIFENESIN 600 MG/1
600 TABLET, EXTENDED RELEASE ORAL EVERY 12 HOURS SCHEDULED
Refills: 0
Start: 2023-01-13

## 2023-01-13 RX ADMIN — GUAIFENESIN 600 MG: 600 TABLET, EXTENDED RELEASE ORAL at 08:08

## 2023-01-13 RX ADMIN — IBUPROFEN 600 MG: 600 TABLET, FILM COATED ORAL at 00:14

## 2023-01-13 RX ADMIN — IBUPROFEN 600 MG: 600 TABLET, FILM COATED ORAL at 06:20

## 2023-01-13 RX ADMIN — ACETAMINOPHEN 650 MG: 325 TABLET ORAL at 00:14

## 2023-01-13 RX ADMIN — DOCUSATE SODIUM 100 MG: 100 CAPSULE, LIQUID FILLED ORAL at 08:08

## 2023-01-13 RX ADMIN — ACETAMINOPHEN 650 MG: 325 TABLET ORAL at 07:52

## 2023-01-13 RX ADMIN — IBUPROFEN 600 MG: 600 TABLET, FILM COATED ORAL at 12:28

## 2023-01-13 NOTE — UTILIZATION REVIEW
NOTIFICATION OF ADMISSION DISCHARGE   This is a Notification of Discharge from [address]  Please be advised that this patient has been discharge from our facility  Below you will find the admission and discharge date and time including the patient’s disposition  UTILIZATION REVIEW CONTACT:  Luis Carlos Cross  Utilization   Network Utilization Review Department  Phone: [phone] x carefully listen to the prompts  All voicemails are confidential   Email: [email or web address]     ADMISSION INFORMATION  PRESENTATION DATE: 1/9/2023  8:40 PM  OBERVATION ADMISSION DATE:   INPATIENT ADMISSION DATE: 1/9/23  9:31 PM   DISCHARGE DATE: 1/13/2023  2:09 PM   DISPOSITION:Home/Self Care    IMPORTANT INFORMATION:  Send all requests for admission clinical reviews, approved or denied determinations and any other requests to dedicated fax number below belonging to the campus where the patient is receiving treatment   List of dedicated fax numbers:  [address] DENIALS (Administrative/Medical Necessity) [phone]   [address] (Maternity/NICU/Pediatrics) [phone]   [facility] [phone]   [place] [address] [phone]   [address] [phone]   [address] [facility] [phone] 90 [facility] [phone]   [address] [place], [place] [address] [address] [phone]   [facility]  [phone]   [address] [facility] [phone]   [address] [facility] [address] [facility] [phone]

## 2023-01-13 NOTE — PROGRESS NOTES
Progress Note - OB/GYN  Melissa Moctezuma 33 y o  female MRN: [id number]  Unit/Bed#: L&D 312-01 Encounter: [id number]    Assessment and Plan     Melissa Moctezuma is a patient of: [facility]  She is PPD# 2 s/p VAVD  Recovering well and is stable       * Status post vacuum-assisted vaginal delivery  Assessment & Plan  Lochia WNL   Recovering well   Appropriate bowel and bladder function   Pain well controlled   Tolerating diet   Breastfeeding: yes  Ambulating without issues   No lower extremity tenderness  GBS negative    Rh positive     Former smoker  Assessment & Plan  Quit after finding out she was pregnant      Disposition    - Anticipate discharge home on PPD# 2      Subjective/Objective     Chief Complaint: Postpartum State     Subjective:    Melissa Moctezuma is PPD#2 s/p VAVD  She has no current complaints  Pain is well controlled  Patient is currently voiding  She is ambulating  Patient is currently passing flatus and has had no bowel movement  She is tolerating PO, and denies nausea or vomitting  Patient denies fever, chills, chest pain, shortness of breath, or calf tenderness  Lochia is normal  She is  Breastfeeding  She is recovering well and is stable         Vitals:   /59 (BP Location: Left arm)   Pulse 70   Temp 97 5 °F (36 4 °C) (Oral)   Resp 18   Ht 5' (1 524 m)   Wt 69 kg (152 lb 3 2 oz)   LMP 04/09/2022 (Exact Date)   SpO2 99%   Breastfeeding Yes   BMI 29 72 kg/m²     No intake or output data in the 24 hours ending 01/13/23 0626    Invasive Devices     None                 Physical Exam:   GEN: Melissa Moctezuma appears well, alert and oriented x 3, pleasant and cooperative   CARDIO: RRR, no murmurs or rubs  RESP:  CTAB, no wheezes or rales  ABDOMEN: soft, no tenderness, no distention, fundus @ 2cm below u   EXTREMITIES:  non tender, no erythema      Labs:     Hemoglobin   Date Value Ref Range Status   01/09/2023 10 8 (L) 11 5 - 15 4 g/dL Final   07/05/2022 13 1 11 5 - 15 4 g/dL Final     WBC   Date Value Ref Range Status   01/09/2023 9 73 4 31 - 10 16 Thousand/uL Final   07/05/2022 9 03 4 31 - 10 16 Thousand/uL Final     Platelets   Date Value Ref Range Status   01/09/2023 200 149 - 390 Thousands/uL Final   07/05/2022 258 149 - 390 Thousands/uL Final          Yuko Peralta MD  1/13/2023  6:26 AM

## 2023-01-13 NOTE — NURSING NOTE
Discharge education reviewed with patient  Educational packet provided as well as [facility]  Patient verbalized understanding and encouraged to continue to ask questions prior to discharge

## 2023-01-13 NOTE — LACTATION NOTE
This note was copied from a baby's chart  CONSULT - LACTATION  Baby Girl Kayla Brewerwin 2 days female MRN: [id number]    [facility] Room / Bed: L&D 312(N)/L&D 312(N) Encounter: [id number]    Maternal Information     MOTHER:  Evelia Christine  Maternal Age: 33 y o    OB History: # 1 - Date: 23, Sex: Female, Weight: 3950 g (8 lb 11 3 oz), GA: 39w4d, Delivery: Vaginal, Vacuum (Extractor), Apgar1: 8, Apgar5: 9, Living: Living, Birth Comments: None   Previouse breast reduction surgery? No    Lactation history:   Has patient previously breast fed: No   How long had patient previously breast fed:     Previous breast feeding complications:       Past Surgical History:   Procedure Laterality Date   • HYMENECTOMY     • DC CONIZATION CERVIX W/WO D&C RPR ELTRD EXC N/A 2017    Procedure: BIOPSY LEEP CERVIX;  Surgeon: Rani Garcia DO;  Location: [facility];  Service: Gynecology   • TONSILLECTOMY          Birth information:  YOB: 2022   Time of birth: 12:45 PM   Sex: female   Delivery type: Vaginal, Vacuum (Extractor)   Birth Weight: 3950 g (8 lb 11 3 oz)   Percent of Weight Change: -7%     Gestational Age: 43w3d   [unfilled]    Assessment     Breast and nipple assessment: large breast     Assessment: restricted tongue movement    Feeding assessment: feeding well as per Mom     LATCH:  Latch: Grasps breast, tongue down, lips flanged, rhythmic sucking   Audible Swallowing: A few with stimulation   Type of Nipple: Everted (After stimulation)   Comfort (Breast/Nipple): Filling, red/small blisters/bruises, mild/moderate discomfort   Hold (Positioning): No assist from staff, mother able to position/hold infant   LATCH Score: 8          Feeding recommendations:  breast feed on demand     Mother verbalized breastfeeding is going well  Mom states baby has been cluster feeding  Discussed 2nd night syndrome and ways to calm infant   Mom has good breastfeeding support at Home  Dad supportive at bedside  Enc to call for assistance as needed,phone # given                      Marie Sam RN 1/13/2023 12:27 PM

## 2023-01-13 NOTE — PLAN OF CARE
Problem: PAIN - ADULT  Goal: Verbalizes/displays adequate comfort level or baseline comfort level  Description: Interventions:  - Encourage patient to monitor pain and request assistance  - Assess pain using appropriate pain scale  - Administer analgesics based on type and severity of pain and evaluate response  - Implement non-pharmacological measures as appropriate and evaluate response  - Consider cultural and social influences on pain and pain management  - Notify physician/advanced practitioner if interventions unsuccessful or patient reports new pain  Outcome: Progressing     Problem: INFECTION - ADULT  Goal: Absence or prevention of progression during hospitalization  Description: INTERVENTIONS:  - Assess and monitor for signs and symptoms of infection  - Monitor lab/diagnostic results  - Monitor all insertion sites, i e  indwelling lines, tubes, and drains  - Monitor endotracheal if appropriate and nasal secretions for changes in amount and color  - [place] appropriate cooling/warming therapies per order  - Administer medications as ordered  - Instruct and encourage patient and family to use good hand hygiene technique  - Identify and instruct in appropriate isolation precautions for identified infection/condition  Outcome: Progressing  Goal: Absence of fever/infection during neutropenic period  Description: INTERVENTIONS:  - Monitor WBC    Outcome: Progressing     Problem: SAFETY ADULT  Goal: Patient will remain free of falls  Description: INTERVENTIONS:  - Educate patient/family on patient safety including physical limitations  - Instruct patient to call for assistance with activity   - Consult OT/PT to assist with strengthening/mobility   - Keep Call bell within reach  - Keep bed low and locked with side rails adjusted as appropriate  - Keep care items and personal belongings within reach  - Initiate and maintain comfort rounds  Outcome: Progressing  Goal: Maintain or return to baseline ADL function  Description: INTERVENTIONS:  -  Assess patient's ability to carry out ADLs; assess patient's baseline for ADL function and identify physical deficits which impact ability to perform ADLs (bathing, care of mouth/teeth, toileting, grooming, dressing, etc )  - Assess/evaluate cause of self-care deficits   - Assess range of motion  - Assess patient's mobility; develop plan if impaired  - Assess patient's need for assistive devices and provide as appropriate  - Encourage maximum independence but intervene and supervise when necessary  - Involve family in performance of ADLs  - Assess for home care needs following discharge   - Consider OT consult to assist with ADL evaluation and planning for discharge  - Provide patient education as appropriate  Outcome: Progressing  Goal: Maintains/Returns to pre admission functional level  Description: INTERVENTIONS:  - Perform BMAT or MOVE assessment daily    - Set and communicate daily mobility goal to care team and patient/family/caregiver  - Collaborate with rehabilitation services on mobility goals if consulted  Outcome: Progressing     Problem: Knowledge Deficit  Goal: Patient/family/caregiver demonstrates understanding of disease process, treatment plan, medications, and discharge instructions  Description: Complete learning assessment and assess knowledge base    Interventions:  - Provide teaching at level of understanding  - Provide teaching via preferred learning methods  Outcome: Progressing     Problem: DISCHARGE PLANNING  Goal: Discharge to home or other facility with appropriate resources  Description: INTERVENTIONS:  - Identify barriers to discharge w/patient and caregiver  - Arrange for needed discharge resources and transportation as appropriate  - Identify discharge learning needs (meds, wound care, etc )  - Arrange for interpretive services to assist at discharge as needed  - Refer to Case Management Department for coordinating discharge planning if the patient needs post-hospital services based on physician/advanced practitioner order or complex needs related to functional status, cognitive ability, or social support system  Outcome: Progressing     Problem: POSTPARTUM  Goal: Experiences normal postpartum course  Description: INTERVENTIONS:  - Monitor maternal vital signs  - Assess uterine involution and lochia  Outcome: Progressing  Goal: Appropriate maternal -  bonding  Description: INTERVENTIONS:  - Identify family support  - Assess for appropriate maternal/infant bonding   -Encourage maternal/infant bonding opportunities  - Referral to  or  as needed  Outcome: Progressing  Goal: Establishment of infant feeding pattern  Description: INTERVENTIONS:  - Assess breast/bottle feeding  - Refer to lactation as needed  Outcome: Progressing  Goal: Incision(s), wounds(s) or drain site(s) healing without S/S of infection  Description: INTERVENTIONS  - Assess and document dressing, incision, wound bed, drain sites and surrounding tissue  - Provide patient and family education  Outcome: Progressing

## 2023-01-13 NOTE — PROGRESS NOTES
Progress Note - OB/GYN  Fly Holloway 33 y o  female MRN: [id number]  Unit/Bed#: L&D 312-01 Encounter: [id number]    Assessment and Plan     Fly Holloway is a patient of: [facility]  She is PPD#2 s/p  vacuum, outlet  Recovering well and is stable       * Status post vacuum-assisted vaginal delivery  Assessment & Plan  -prolonged second stage of labor  -spontaneous second degree laceration with suture repair  -lochia WNL   -recovering well   -pain well controlled   -breastfeeding: yes  -ambulating without issues   -GBS negative    -Rh positive     Former smoker  Assessment & Plan  Quit after finding out she was pregnant    Disposition    - Anticipate discharge home on PPD# 2    Subjective/Objective     Chief Complaint: Postpartum State     Subjective:    Fly Holloway is PPD/POD#2 s/p  Vacuum outlet due to prolonged second stage of labor  Currently would like to know when she can use sitz bath and when she should expect a bowel movement  (using colace once daily, and prior to delivery used colace twice daily)  Pain is well controlled  Patient is currently voiding  She is ambulating  Patient is currently passing flatus and has had no bowel movement  She is tolerating PO, and denies nausea or vomitting  Patient denies fever, chills, chest pain, shortness of breath, or calf tenderness  Lochia is normal  She is  Breastfeeding  She is recovering well and is stable  Patient expressed that she is ready to go home today       Vitals:   /59 (BP Location: Left arm)   Pulse 70   Temp 97 5 °F (36 4 °C) (Oral)   Resp 18   Ht 5' (1 524 m)   Wt 69 kg (152 lb 3 2 oz)   LMP 04/09/2022 (Exact Date)   SpO2 99%   Breastfeeding Yes   BMI 29 72 kg/m²     No intake or output data in the 24 hours ending 01/13/23 0835    Invasive Devices     None                 Physical Exam:   GEN: Fly Holloway appears well, alert and oriented x 3, pleasant and cooperative   CARDIO: RRR, no rubs, murmurs, and gallops  RESP:  CTAB, no wheezes, rales, or rhonchi  ABDOMEN: soft, no tenderness, no distention, fundus one inch below umbilicus  EXTREMITIES: non tender with palpation, no erythema, b/l Brooks's sign negative    Labs:     Hemoglobin   Date Value Ref Range Status   01/09/2023 10 8 (L) 11 5 - 15 4 g/dL Final   07/05/2022 13 1 11 5 - 15 4 g/dL Final     WBC   Date Value Ref Range Status   01/09/2023 9 73 4 31 - 10 16 Thousand/uL Final   07/05/2022 9 03 4 31 - 10 16 Thousand/uL Final     Platelets   Date Value Ref Range Status   01/09/2023 200 149 - 390 Thousands/uL Final   07/05/2022 258 149 - 390 Thousands/uL Final        Mei De La Garza MD  1/13/2023  8:35 AM

## 2023-01-19 LAB — PLACENTA IN STORAGE: NORMAL

## 2023-01-20 ENCOUNTER — TELEMEDICINE (OUTPATIENT)
Dept: BEHAVIORAL/MENTAL HEALTH CLINIC | Facility: CLINIC | Age: 33
End: 2023-01-20

## 2023-01-20 DIAGNOSIS — F41.9 ANXIETY: Primary | ICD-10-CM

## 2023-01-20 NOTE — PSYCH
Virtual Regular Visit    Verification of patient location:    Patient is located in the following state in which I hold an active license [place]      Assessment/Plan:    Problem List Items Addressed This Visit    None  Visit Diagnoses     Anxiety    -  Primary          Goals addressed in session: Goal 1  : Obtain background information         Reason for visit is   Chief Complaint   Patient presents with   • Virtual Regular Visit        Encounter provider Naida Martin LCSW    Provider located at [place] [id number] [facility] [address] [facility] [id number] [phone]      Recent Visits  No visits were found meeting these conditions  Showing recent visits within past 7 days and meeting all other requirements  Today's Visits  Date Type Provider Dept   01/20/23 Telemedicine Naida Martin LCSW [facility] [address] today's visits and meeting all other requirements  Future Appointments  No visits were found meeting these conditions  Showing future appointments within next 150 days and meeting all other requirements       The patient was identified by name and date of birth  Antwan Patten was informed that this is a telemedicine visit and that the visit is being conducted throughthe [employer] platform  She agrees to proceed     My office door was closed  No one else was in the room  She acknowledged consent and understanding of privacy and security of the video platform  The patient has agreed to participate and understands they can discontinue the visit at any time  Patient is aware this is a billable service  Subjective  Antwan Patten is a 33 y o  female who presents for follow up therapy session  Pt provided all necessary background information  Pt grew up in [place] with both parents and 4 siblings  Pt has a good relationship with her family   Pt believes there is undiagnosed MH in her parents  Her brother has been diagnosed with depression and anxiety and her paternal aunt has depression  Pt has a bachelor's degree in Electronic Media, TV, Film, & Radio  Pt enjoys crafts, watching wrestling (her  is a ), cleaning and watching True Crime shows  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  Pt is  to her , Roshan Barnett who is a  for [employer]  Pt gave birth to Anibal on 1/11/23  This was a planned pregnancy and they had been trying for 2 years  Pt had a good pregnancy  Pt's birthing experience was long  She was induced and was in labor for 40 hours  Pt exclusively pumping and bottle feeding  She has a very good supply and is pumping every 2 hours  Pt has had one episode of mastitis  Pt has been experiencing post partum anxiety and "irrational thoughts "  She has had decreased appetite  Pt is drinking lots of fluids however  She has good supports from friends and family  Pt is also concerned about the family dog  She explained that the dog became very protective of her near the end of her pregnancy and when they brought the baby home, the dog attacked pt's   The dog was taken to pt's mother's house and he just came home last night  They are taking the dog to a behaviorist today  Processed pt's feelings at length and discussed coping skills and medication if she chooses to discuss this with her OB  HPI     No past medical history on file      Past Surgical History:   Procedure Laterality Date   • HYMENECTOMY     • AR CONIZATION CERVIX W/WO D&C RPR ELTRD EXC N/A 7/20/2017    Procedure: BIOPSY LEEP CERVIX;  Surgeon: Nena Dunn DO;  Location: [facility];  Service: Gynecology   • TONSILLECTOMY         Current Outpatient Medications   Medication Sig Dispense Refill   • acetaminophen (TYLENOL) 325 mg tablet Take 2 tablets (650 mg total) by mouth every 4 (four) hours as needed for mild pain  0   • benzocaine-menthol-lanolin-aloe (DERMOPLAST) 20-0 5 % topical spray Apply 1 application topically every 6 (six) hours as needed for mild pain or irritation  0   • docusate sodium (COLACE) 100 mg capsule Take 1 capsule (100 mg total) by mouth 2 (two) times a day  0   • guaiFENesin (MUCINEX) 600 mg 12 hr tablet Take 1 tablet (600 mg total) by mouth every 12 (twelve) hours  0   • ibuprofen (MOTRIN) 600 mg tablet Take 1 tablet (600 mg total) by mouth every 6 (six) hours 30 tablet 0   • witch hazel-glycerin (TUCKS) topical pad Apply 1 pad topically every 4 (four) hours as needed for irritation  0     No current facility-administered medications for this visit  Allergies   Allergen Reactions   • Codeine        Review of Systems : Pt was pleasant, cooperative and engaged  Pt was tearful at times  Video Exam    There were no vitals filed for this visit  Physical Exam     Assessment/Plan:      Diagnoses and all orders for this visit:    Anxiety          Subjective:      Patient ID: Kenn Trevizo is a 33 y o  female  HPI:     Pre-morbid level of function and History of Present Illness: Onset s/p delivery  Previous Psychiatric/psychological treatment/year: NA  Current Psychiatrist/Therapist: [facility]  Outpatient and/or Partial and Other [employer] Used ([facility], [facility], [facility]): NA      Problem Assessment:     SOCIAL/VOCATION:  Family Constellation (include parents, relationship with each and pertinent Psych/Medical History):     No family history on file  Mother: Undiagnosed  Spouse: NA   Father: Undiagnosed   Children: NA   Sibling: NA   Sibling: Brother - depression/anxiety   Children: NA   Other: Paternal [de-identified] - depression    Jannelle Schaumann relates best to her husband2  she lives with her   she does not live alone  Domestic Violence: No past history of domestic violence    Additional Comments related to family/relationships/peer support: Pt has good support from her  and family       School or Work History (strengths/limitations/needs): Pt was working FT prior to pregnancy; 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 No    FUNCTIONAL STATUS: There has been a recent change in Jovany Roberts ability to do the following: NA    Level of Assistance Needed/By Whom?: NA    Jovany Roberts learns best by  NA    SUBSTANCE ABUSE ASSESSMENT: no substance abuse    Substance/Route/Age/Amount/Frequency/Last Use: NA    DETOX HISTORY: NA    Previous detox/rehab treatment: NA    HEALTH ASSESSMENT: no referral to PCP needed    LEGAL: No Mental Health Advance Directive or Power of  on file    Prenatal History: uneventful pregnancy    Delivery History: N/A    Developmental Milestones: N/A  Temperament as an infant was N/A  Temperament as a toddler was N/A  Temperament at school age was N/A  Temperament as a teenager was N/A  Risk Assessment:   The following ratings are based on my N/A    Risk of Harm to Self:   Demographic risk factors include   Historical Risk Factors include NA  Recent Specific Risk Factors include diagnosis of depression   Additional Factors for a Child or Adolescent gender: female (more likely to attempt)    Risk of Harm to Others:   Demographic Risk Factors include NA  Historical Risk Factors include NA  Recent Specific Risk Factors include NA    Access to Weapons:   Jovany Roberts has access to the following weapons: None   The following steps have been taken to ensure weapons are properly secured: NA    Based on the above information, the client presents the following risk of harm to self or others:  low    The following interventions are recommended:   no intervention changes    Notes regarding this Risk Assessment: NA        Review Of Systems:     Mood Anxiety and Depression   Behavior Normal    Thought Content Normal   General Normal    Personality Normal   Other Psych Symptoms Normal   Constitutional Normal   ENT Normal   Cardiovascular Normal    Respiratory Normal    Gastrointestinal Normal   Genitourinary Normal    Musculoskeletal Negative   Integumentary Normal    Neurological Normal    Endocrine Normal          Mental status:  Appearance calm and cooperative    Mood depressed and anxious   Affect affect was tearful   Speech a normal rate   Thought Processes normal thought processes   Hallucinations no hallucinations present    Thought Content no delusions   Abnormal Thoughts no suicidal thoughts  and no homicidal thoughts    Orientation  oriented to person and place and time   Remote Memory short term memory intact and long term memory intact   Attention Span concentration intact   Intellect Appears to be of Average Intelligence   Fund of Knowledge displays adequate knowledge of current events   Insight Insight intact   Judgement judgment was intact   Muscle Strength Muscle strength and tone were normal   Language na   Pain none   Pain Scale 0     Visit start and stop times:    01/20/23  Start Time: 0802  Stop Time: 0843  Total Visit Time: 41 minutes

## 2023-01-23 ENCOUNTER — HOSPITAL ENCOUNTER (INPATIENT)
Facility: HOSPITAL | Age: 33
LOS: 2 days | Discharge: HOME | End: 2023-01-25
Attending: OBSTETRICS & GYNECOLOGY | Admitting: OBSTETRICS & GYNECOLOGY

## 2023-01-23 DIAGNOSIS — Z87.59 STATUS POST VACUUM-ASSISTED VAGINAL DELIVERY: Primary | ICD-10-CM

## 2023-01-23 DIAGNOSIS — F32.89 OTHER DEPRESSION: ICD-10-CM

## 2023-01-23 PROBLEM — N61.0 MASTITIS: Status: ACTIVE | Noted: 2023-01-23

## 2023-01-23 PROBLEM — F32.A DEPRESSION: Status: ACTIVE | Noted: 2023-01-23

## 2023-01-23 LAB
ALBUMIN SERPL BCP-MCNC: 3.7 G/DL (ref 3.5–5)
ALP SERPL-CCNC: 99 U/L (ref 34–104)
ALT SERPL W P-5'-P-CCNC: 20 U/L (ref 7–52)
ANION GAP SERPL CALCULATED.3IONS-SCNC: 10 MMOL/L (ref 4–13)
AST SERPL W P-5'-P-CCNC: 13 U/L (ref 13–39)
BASOPHILS # BLD AUTO: 0.06 THOUSANDS/ÂΜL (ref 0–0.1)
BASOPHILS NFR BLD AUTO: 0 % (ref 0–1)
BILIRUB SERPL-MCNC: 0.28 MG/DL (ref 0.2–1)
BUN SERPL-MCNC: 14 MG/DL (ref 5–25)
CALCIUM SERPL-MCNC: 8.7 MG/DL (ref 8.4–10.2)
CHLORIDE SERPL-SCNC: 108 MMOL/L (ref 96–108)
CO2 SERPL-SCNC: 23 MMOL/L (ref 21–32)
CREAT SERPL-MCNC: 0.74 MG/DL (ref 0.6–1.3)
EOSINOPHIL # BLD AUTO: 0.06 THOUSAND/ÂΜL (ref 0–0.61)
EOSINOPHIL NFR BLD AUTO: 0 % (ref 0–6)
ERYTHROCYTE [DISTWIDTH] IN BLOOD BY AUTOMATED COUNT: 14.1 % (ref 11.6–15.1)
GFR SERPL CREATININE-BSD FRML MDRD: 108 ML/MIN/1.73SQ M
GLUCOSE SERPL-MCNC: 103 MG/DL (ref 65–140)
HCT VFR BLD AUTO: 33.3 % (ref 34.8–46.1)
HGB BLD-MCNC: 10.9 G/DL (ref 11.5–15.4)
IMM GRANULOCYTES # BLD AUTO: 0.1 THOUSAND/UL (ref 0–0.2)
IMM GRANULOCYTES NFR BLD AUTO: 1 % (ref 0–2)
LYMPHOCYTES # BLD AUTO: 1.84 THOUSANDS/ÂΜL (ref 0.6–4.47)
LYMPHOCYTES NFR BLD AUTO: 12 % (ref 14–44)
MAGNESIUM SERPL-MCNC: 1.7 MG/DL (ref 1.9–2.7)
MCH RBC QN AUTO: 29.2 PG (ref 26.8–34.3)
MCHC RBC AUTO-ENTMCNC: 32.7 G/DL (ref 31.4–37.4)
MCV RBC AUTO: 89 FL (ref 82–98)
MONOCYTES # BLD AUTO: 0.5 THOUSAND/ÂΜL (ref 0.17–1.22)
MONOCYTES NFR BLD AUTO: 3 % (ref 4–12)
NEUTROPHILS # BLD AUTO: 12.81 THOUSANDS/ÂΜL (ref 1.85–7.62)
NEUTS SEG NFR BLD AUTO: 84 % (ref 43–75)
NRBC BLD AUTO-RTO: 0 /100 WBCS
PLATELET # BLD AUTO: 420 THOUSANDS/UL (ref 149–390)
PMV BLD AUTO: 10.5 FL (ref 8.9–12.7)
POTASSIUM SERPL-SCNC: 3.4 MMOL/L (ref 3.5–5.3)
PROT SERPL-MCNC: 6.8 G/DL (ref 6.4–8.4)
RBC # BLD AUTO: 3.73 MILLION/UL (ref 3.81–5.12)
SODIUM SERPL-SCNC: 141 MMOL/L (ref 135–147)
WBC # BLD AUTO: 15.37 THOUSAND/UL (ref 4.31–10.16)

## 2023-01-23 RX ORDER — CEPHALEXIN 500 MG/1
500 CAPSULE ORAL EVERY 6 HOURS
Status: DISCONTINUED | OUTPATIENT
Start: 2023-01-23 | End: 2023-01-25 | Stop reason: HOSPADM

## 2023-01-23 RX ORDER — IBUPROFEN 600 MG/1
600 TABLET ORAL EVERY 6 HOURS PRN
Status: DISCONTINUED | OUTPATIENT
Start: 2023-01-23 | End: 2023-01-25 | Stop reason: HOSPADM

## 2023-01-23 RX ORDER — METOCLOPRAMIDE HYDROCHLORIDE 5 MG/ML
10 INJECTION INTRAMUSCULAR; INTRAVENOUS EVERY 6 HOURS PRN
Status: DISCONTINUED | OUTPATIENT
Start: 2023-01-23 | End: 2023-01-25 | Stop reason: HOSPADM

## 2023-01-23 RX ORDER — MAGNESIUM SULFATE HEPTAHYDRATE 40 MG/ML
4 INJECTION, SOLUTION INTRAVENOUS ONCE
Status: COMPLETED | OUTPATIENT
Start: 2023-01-23 | End: 2023-01-23

## 2023-01-23 RX ORDER — CEPHALEXIN 500 MG/1
500 CAPSULE ORAL EVERY 6 HOURS SCHEDULED
Status: DISCONTINUED | OUTPATIENT
Start: 2023-01-24 | End: 2023-01-23

## 2023-01-23 RX ORDER — LABETALOL HYDROCHLORIDE 5 MG/ML
20 INJECTION, SOLUTION INTRAVENOUS ONCE
Status: COMPLETED | OUTPATIENT
Start: 2023-01-23 | End: 2023-01-23

## 2023-01-23 RX ORDER — MAGNESIUM SULFATE HEPTAHYDRATE 40 MG/ML
2 INJECTION, SOLUTION INTRAVENOUS CONTINUOUS
Status: DISCONTINUED | OUTPATIENT
Start: 2023-01-23 | End: 2023-01-24

## 2023-01-23 RX ORDER — CALCIUM GLUCONATE 94 MG/ML
1 INJECTION, SOLUTION INTRAVENOUS ONCE AS NEEDED
Status: DISCONTINUED | OUTPATIENT
Start: 2023-01-23 | End: 2023-01-25 | Stop reason: HOSPADM

## 2023-01-23 RX ORDER — MAGNESIUM SULFATE HEPTAHYDRATE 40 MG/ML
2 INJECTION, SOLUTION INTRAVENOUS ONCE
Status: COMPLETED | OUTPATIENT
Start: 2023-01-23 | End: 2023-01-23

## 2023-01-23 RX ORDER — SODIUM CHLORIDE, SODIUM LACTATE, POTASSIUM CHLORIDE, CALCIUM CHLORIDE 600; 310; 30; 20 MG/100ML; MG/100ML; MG/100ML; MG/100ML
50 INJECTION, SOLUTION INTRAVENOUS CONTINUOUS
Status: DISCONTINUED | OUTPATIENT
Start: 2023-01-23 | End: 2023-01-25 | Stop reason: HOSPADM

## 2023-01-23 RX ORDER — NIFEDIPINE 10 MG/1
10 CAPSULE ORAL EVERY 8 HOURS SCHEDULED
Status: CANCELLED | OUTPATIENT
Start: 2023-01-23

## 2023-01-23 RX ADMIN — LABETALOL HYDROCHLORIDE 20 MG: 5 INJECTION, SOLUTION INTRAVENOUS at 20:35

## 2023-01-23 RX ADMIN — CEPHALEXIN 500 MG: 500 CAPSULE ORAL at 21:46

## 2023-01-23 RX ADMIN — SODIUM CHLORIDE, SODIUM LACTATE, POTASSIUM CHLORIDE, AND CALCIUM CHLORIDE 50 ML/HR: .6; .31; .03; .02 INJECTION, SOLUTION INTRAVENOUS at 20:40

## 2023-01-23 RX ADMIN — METOCLOPRAMIDE 10 MG: 5 INJECTION, SOLUTION INTRAMUSCULAR; INTRAVENOUS at 21:23

## 2023-01-23 RX ADMIN — MAGNESIUM SULFATE IN WATER 2 G: 40 INJECTION, SOLUTION INTRAVENOUS at 20:39

## 2023-01-23 RX ADMIN — MAGNESIUM SULFATE HEPTAHYDRATE 2 G/HR: 40 INJECTION, SOLUTION INTRAVENOUS at 21:22

## 2023-01-23 RX ADMIN — MAGNESIUM SULFATE HEPTAHYDRATE 4 G: 40 INJECTION, SOLUTION INTRAVENOUS at 20:56

## 2023-01-24 LAB
ALBUMIN SERPL BCP-MCNC: 3.6 G/DL (ref 3.5–5)
ALP SERPL-CCNC: 96 U/L (ref 34–104)
ALT SERPL W P-5'-P-CCNC: 18 U/L (ref 7–52)
ANION GAP SERPL CALCULATED.3IONS-SCNC: 9 MMOL/L (ref 4–13)
AST SERPL W P-5'-P-CCNC: 11 U/L (ref 13–39)
BASOPHILS # BLD AUTO: 0.07 THOUSANDS/ÂΜL (ref 0–0.1)
BASOPHILS NFR BLD AUTO: 1 % (ref 0–1)
BILIRUB SERPL-MCNC: 0.39 MG/DL (ref 0.2–1)
BUN SERPL-MCNC: 8 MG/DL (ref 5–25)
CALCIUM SERPL-MCNC: 6.7 MG/DL (ref 8.4–10.2)
CHLORIDE SERPL-SCNC: 103 MMOL/L (ref 96–108)
CO2 SERPL-SCNC: 25 MMOL/L (ref 21–32)
CREAT SERPL-MCNC: 0.64 MG/DL (ref 0.6–1.3)
CREAT UR-MCNC: 29.4 MG/DL
EOSINOPHIL # BLD AUTO: 0.06 THOUSAND/ÂΜL (ref 0–0.61)
EOSINOPHIL NFR BLD AUTO: 1 % (ref 0–6)
ERYTHROCYTE [DISTWIDTH] IN BLOOD BY AUTOMATED COUNT: 14 % (ref 11.6–15.1)
GFR SERPL CREATININE-BSD FRML MDRD: 119 ML/MIN/1.73SQ M
GLUCOSE SERPL-MCNC: 116 MG/DL (ref 65–140)
GLUCOSE SERPL-MCNC: 96 MG/DL (ref 65–140)
HCT VFR BLD AUTO: 32 % (ref 34.8–46.1)
HGB BLD-MCNC: 10.6 G/DL (ref 11.5–15.4)
IMM GRANULOCYTES # BLD AUTO: 0.09 THOUSAND/UL (ref 0–0.2)
IMM GRANULOCYTES NFR BLD AUTO: 1 % (ref 0–2)
LYMPHOCYTES # BLD AUTO: 2.23 THOUSANDS/ÂΜL (ref 0.6–4.47)
LYMPHOCYTES NFR BLD AUTO: 17 % (ref 14–44)
MAGNESIUM SERPL-MCNC: 6.4 MG/DL (ref 1.9–2.7)
MCH RBC QN AUTO: 29 PG (ref 26.8–34.3)
MCHC RBC AUTO-ENTMCNC: 33.1 G/DL (ref 31.4–37.4)
MCV RBC AUTO: 87 FL (ref 82–98)
MONOCYTES # BLD AUTO: 0.61 THOUSAND/ÂΜL (ref 0.17–1.22)
MONOCYTES NFR BLD AUTO: 5 % (ref 4–12)
NEUTROPHILS # BLD AUTO: 10.17 THOUSANDS/ÂΜL (ref 1.85–7.62)
NEUTS SEG NFR BLD AUTO: 75 % (ref 43–75)
NRBC BLD AUTO-RTO: 0 /100 WBCS
PLATELET # BLD AUTO: 403 THOUSANDS/UL (ref 149–390)
PMV BLD AUTO: 9.8 FL (ref 8.9–12.7)
POTASSIUM SERPL-SCNC: 2.9 MMOL/L (ref 3.5–5.3)
PROT SERPL-MCNC: 6.5 G/DL (ref 6.4–8.4)
PROT UR-MCNC: 11 MG/DL
PROT/CREAT UR: 0.37 MG/G{CREAT} (ref 0–0.1)
RBC # BLD AUTO: 3.66 MILLION/UL (ref 3.81–5.12)
SODIUM SERPL-SCNC: 137 MMOL/L (ref 135–147)
WBC # BLD AUTO: 13.23 THOUSAND/UL (ref 4.31–10.16)

## 2023-01-24 RX ORDER — POTASSIUM CHLORIDE 20 MEQ/1
40 TABLET, EXTENDED RELEASE ORAL ONCE
Status: COMPLETED | OUTPATIENT
Start: 2023-01-24 | End: 2023-01-24

## 2023-01-24 RX ORDER — ACETAMINOPHEN 325 MG/1
975 TABLET ORAL EVERY 6 HOURS PRN
Status: DISCONTINUED | OUTPATIENT
Start: 2023-01-24 | End: 2023-01-25 | Stop reason: HOSPADM

## 2023-01-24 RX ADMIN — CEPHALEXIN 500 MG: 500 CAPSULE ORAL at 09:11

## 2023-01-24 RX ADMIN — ACETAMINOPHEN 975 MG: 325 TABLET ORAL at 07:21

## 2023-01-24 RX ADMIN — ACETAMINOPHEN 975 MG: 325 TABLET ORAL at 01:42

## 2023-01-24 RX ADMIN — IBUPROFEN 600 MG: 600 TABLET, FILM COATED ORAL at 11:19

## 2023-01-24 RX ADMIN — POTASSIUM CHLORIDE 40 MEQ: 1500 TABLET, EXTENDED RELEASE ORAL at 07:22

## 2023-01-24 RX ADMIN — CEPHALEXIN 500 MG: 500 CAPSULE ORAL at 15:12

## 2023-01-24 RX ADMIN — CEPHALEXIN 500 MG: 500 CAPSULE ORAL at 02:46

## 2023-01-24 RX ADMIN — ACETAMINOPHEN 975 MG: 325 TABLET ORAL at 15:12

## 2023-01-24 RX ADMIN — CEPHALEXIN 500 MG: 500 CAPSULE ORAL at 21:31

## 2023-01-24 RX ADMIN — IBUPROFEN 600 MG: 600 TABLET, FILM COATED ORAL at 19:16

## 2023-01-24 RX ADMIN — SERTRALINE HYDROCHLORIDE 25 MG: 50 TABLET ORAL at 09:11

## 2023-01-24 RX ADMIN — MAGNESIUM SULFATE HEPTAHYDRATE 2 G/HR: 40 INJECTION, SOLUTION INTRAVENOUS at 07:38

## 2023-01-24 NOTE — ASSESSMENT & PLAN NOTE
Systolic (77VHD), EMO:128 , Min:90 , VW   Diastolic (27ZRM), PGJ:55, Min:50, Max:78  Reports mild headache last night, no vision changes  She does report she has new glasses     Denies  chest pain, shortness of breath, acute swelling, right upper quadrant or epigastric pain  S/p 12h magnesium   Tylenol, Motrin, and Reglan for headache management  Continue to trend pressures  Continue to monitor for evolution of symptoms

## 2023-01-24 NOTE — H&P
H&P Exam - Gynecology   Ronnie Dyson 33 y o  female MRN: [id number]  Unit/Bed#: L&D 313-01 Encounter: [id number]    Assessment: Ronnie Dyson is a 33 y o  female who presents with sustained severe range blood pressures with associated headache and vision changes  New diagnosis of preeclampsia with severe features  Acute management with IV labetalol  Ongoing magnesium gtt  Admitted for inpatient management  Plan:  Depression  Assessment & Plan  Zoloft 25mg qam    Mastitis  Assessment & Plan  Ongoing treatment for mastitis diagnosed on 1/16/23  Keflex 500mg q6h for 14 days (day 8/14)    Status post vacuum-assisted vaginal delivery  Assessment & Plan  VAVD completed on 5/20 without complication  No prior history of pregnancy induced hypertensive disorders    * Preeclampsia in postpartum period  Assessment & Plan  Systolic (05TRY), QUZ:962 , Min:144 , INH:038   Diastolic (94DKA), IZABELLA:55, Min:79, Max:83    Sustained severe range pressures on admission  Symptoms including intractable headache and blurry vision  Denies chest pain, shortness of breath, acute swelling, right upper quadrant or epigastric pain  Labetalol 20mg IV for acute management  Magnesium gtt  Tylenol, Motrin, and Reglan for headache management  Continue to trend pressures  Continue to monitor for evolution of symptoms      Discussed case and plan w/ Dr Ash Camacho    History of Present Illness   HPI:  Ronnie Dyson is a 33 y o  female who presents with new onset of intractable headache and vision changes  Patient reports that she has had intractable headache throughout the day but starting at 6 PM tonight she started having blurry vision and throbbing headache  She presented to the ED for further assessment and was found to have sustained severe range blood pressures in the 682C systolic    She was admitted to the postpartum unit, IV access was obtained, and IV labetalol was pushed for acute management with magnesium infusion to follow  Patient reports that she had taken Tylenol for headache but symptoms are refractory to treatment  She also reports that she recently restarted taking Zoloft for history of depression and started a 14-day course of Keflex for newly diagnosed mastitis on 2023  I discussed with patient the new diagnosis of preeclampsia and expected treatment with 24-hour course of magnesium  We discussed monitoring her pressures thereafter for resolution of severe range presentation  Patient expressed understanding and was amenable to treatment plan  All questions were answered at bedside  Review of Systems   Constitutional: Negative for chills and fever  HENT: Negative for congestion, sinus pressure and sinus pain  Eyes: Positive for visual disturbance  Respiratory: Negative for cough, shortness of breath and wheezing  Cardiovascular: Negative for chest pain, palpitations and leg swelling  Gastrointestinal: Negative for abdominal pain, constipation, diarrhea, nausea and vomiting  Genitourinary: Negative for dysuria, vaginal bleeding and vaginal discharge  Skin: Negative for color change, pallor and rash  Neurological: Positive for headaches  Negative for dizziness, weakness and light-headedness  Psychiatric/Behavioral: Negative for agitation and behavioral problems  Historical Information   History reviewed  No pertinent past medical history  Past Surgical History:   Procedure Laterality Date   • HYMENECTOMY     • MT CONIZATION CERVIX W/WO D&C RPR ELTRD EXC N/A 2017    Procedure: BIOPSY LEEP CERVIX;  Surgeon: Ginna Hyman DO;  Location: [facility];  Service: Gynecology   • TONSILLECTOMY       OB History    Para Term  AB Living   1 1 1     1   SAB IAB Ectopic Multiple Live Births         0 1      # Outcome Date GA Lbr Gavin/2nd Weight Sex Delivery Anes PTL Lv   1 Term 23 39w4d / 02:55 3950 g (8 lb 11 3 oz) F Vag-Vacuum EPI, Local N KALPANA     History reviewed  No pertinent family history  Social History   Social History     Substance and Sexual Activity   Alcohol Use Yes    Comment: social      Social History     Substance and Sexual Activity   Drug Use No     Social History     Tobacco Use   Smoking Status Every Day   Smokeless Tobacco Not on file       Meds/Allergies   Medications Prior to Admission   Medication   • acetaminophen (TYLENOL) 325 mg tablet   • benzocaine-menthol-lanolin-aloe (DERMOPLAST) 20-0 5 % topical spray   • docusate sodium (COLACE) 100 mg capsule   • guaiFENesin (MUCINEX) 600 mg 12 hr tablet   • ibuprofen (MOTRIN) 600 mg tablet   • witch hazel-glycerin (TUCKS) topical pad     Allergies   Allergen Reactions   • Codeine        Objective   /74   Pulse 63   Temp 97 7 °F (36 5 °C) (Oral)   Resp 18   Wt 68 7 kg (151 lb 7 3 oz)   LMP 04/09/2022 (Exact Date)   SpO2 97%   BMI 29 58 kg/m²     No intake or output data in the 24 hours ending 01/23/23 2146    Invasive Devices: Invasive Devices     Peripheral Intravenous Line  Duration           Peripheral IV 01/23/23 Dorsal (posterior); Left Wrist <1 day                Physical Exam  Vitals and nursing note reviewed  Exam conducted with a chaperone present  Constitutional:       General: She is not in acute distress  HENT:      Head: Normocephalic  Right Ear: External ear normal       Left Ear: External ear normal    Eyes:      General: No scleral icterus  Right eye: No discharge  Left eye: No discharge  Conjunctiva/sclera: Conjunctivae normal       Comments: Peripheral visual fields intact by confrontation   Cardiovascular:      Rate and Rhythm: Normal rate and regular rhythm  Pulses: Normal pulses  Heart sounds: Normal heart sounds  Pulmonary:      Effort: Pulmonary effort is normal  No respiratory distress  Breath sounds: Normal breath sounds  Abdominal:      Palpations: Abdomen is soft  Tenderness: There is no abdominal tenderness   There is no guarding  Musculoskeletal:         General: No swelling or tenderness  Normal range of motion  Cervical back: Normal range of motion  Right lower leg: No edema  Left lower leg: No edema  Skin:     General: Skin is warm and dry  Capillary Refill: Capillary refill takes less than 2 seconds  Neurological:      General: No focal deficit present  Mental Status: She is alert and oriented to person, place, and time  Mental status is at baseline  Deep Tendon Reflexes:      Reflex Scores:       Bicep reflexes are 2+ on the right side and 2+ on the left side  Brachioradialis reflexes are 2+ on the right side and 2+ on the left side  Patellar reflexes are 2+ on the right side and 2+ on the left side    Psychiatric:         Mood and Affect: Mood normal          Behavior: Behavior normal          Lab Results:   Admission on 01/23/2023   Component Date Value   • WBC 01/23/2023 15 37 (H)    • RBC 01/23/2023 3 73 (L)    • Hemoglobin 01/23/2023 10 9 (L)    • Hematocrit 01/23/2023 33 3 (L)    • MCV 01/23/2023 89    • MCH 01/23/2023 29 2    • MCHC 01/23/2023 32 7    • RDW 01/23/2023 14 1    • MPV 01/23/2023 10 5    • Platelets 70/77/1875 420 (H)    • nRBC 01/23/2023 0    • Neutrophils Relative 01/23/2023 84 (H)    • Immat GRANS % 01/23/2023 1    • Lymphocytes Relative 01/23/2023 12 (L)    • Monocytes Relative 01/23/2023 3 (L)    • Eosinophils Relative 01/23/2023 0    • Basophils Relative 01/23/2023 0    • Neutrophils Absolute 01/23/2023 12 81 (H)    • Immature Grans Absolute 01/23/2023 0 10    • Lymphocytes Absolute 01/23/2023 1 84    • Monocytes Absolute 01/23/2023 0 50    • Eosinophils Absolute 01/23/2023 0 06    • Basophils Absolute 01/23/2023 0 06    • Sodium 01/23/2023 141    • Potassium 01/23/2023 3 4 (L)    • Chloride 01/23/2023 108    • CO2 01/23/2023 23    • ANION GAP 01/23/2023 10    • BUN 01/23/2023 14    • Creatinine 01/23/2023 0 74    • Glucose 01/23/2023 103    • Calcium 01/23/2023 8 7    • AST 01/23/2023 13    • ALT 01/23/2023 20    • Alkaline Phosphatase 01/23/2023 99    • Total Protein 01/23/2023 6 8    • Albumin 01/23/2023 3 7    • Total Bilirubin 01/23/2023 0 28    • eGFR 01/23/2023 108    • Magnesium 01/23/2023 1 7 (L)       Imaging: I have personally reviewed pertinent reports  EKG, Pathology, and Other Studies: I have personally reviewed pertinent reports        Code Status: Prior  Advance Directive and Living Will:      Power of :    POLST:      Shruthi García MD  1/23/2023  9:49 PM

## 2023-01-24 NOTE — DISCHARGE SUMMARY
Discharge Summary - OB/GYN   Pamela Holland 33 y o  female MRN: [id number]  Unit/Bed#: L&D 887-41 Encounter: [id number]      Admission Date: 2023     Discharge Date: 23    Admitting Diagnosis:   1  Vacuum assisted vaginal delivery on 23  2  Postpartum preeclampsia with severe features  3  Mastitis  4  Depression    Discharge Diagnosis:   Same    Admitting Attending: Mani Henderson, DO  Discharge Attending: [facility] Course:     Pamela Holland is a 33 y o   status post vacuum-assisted vaginal delivery on  who presented from the ED with severe range blood pressures, intractable headache, and blurry vision  Patient was admitted to the postpartum unit for new diagnosis of preeclampsia with severe features, received IV labetalol for acute management, and was started on magnesium infusion for 12 hours of postpartum management  Magnesium was discontinued early due to poor patient tolerance and hypotension  On day of discharge, she was ambulating and able to reasonably perform all ADLs  She was voiding and had appropriate bowel function  Pain was well controlled  BPs were stable    Patient was instructed to follow up with her OB as an outpatient and was given appropriate warnings to call provider if she develops signs of infection or uncontrolled pain  Complications: none apparent    Condition at discharge: stable     Discharge instructions/Information to patient and family:   See after visit summary for information provided to patient and family  Provisions for Follow-Up Care:  See after visit summary for information related to follow-up care and any pertinent home health orders  Disposition: Home    Planned Readmission: No    Discharge Medications: For a complete list of the patient's medications, please refer to her med rec      Allen Bernard  23  6:51 AM

## 2023-01-24 NOTE — ASSESSMENT & PLAN NOTE
VAVD completed on 7/37 without complication  No prior history of pregnancy induced hypertensive disorders

## 2023-01-24 NOTE — ASSESSMENT & PLAN NOTE
Ongoing treatment for mastitis diagnosed on 1/16/23  Keflex 500mg q6h for 14 days (day 10/14)  WBC continues to trend downward     Lab Results   Component Value Date/Time    WBC 8 63 01/25/2023 05:45 AM    WBC 13 23 (H) 01/24/2023 05:37 AM    WBC 15 37 (H) 01/23/2023 08:52 PM

## 2023-01-24 NOTE — NURSING NOTE
Pt up OOB to bathroom  Pt complaining of ringing in ears, nausea and dizziness when standing up from toilet  BP 65/42 and blood sugar 96  Dr Homero Mckeon notified and asked to come see pt  Repeat BP taken a few minutes later still sitting on toilet with a reading of 85/42  Pt wheeled back to bed  Pt BP while laying in bed 93/55 and pt feeling better  No new orders at this time

## 2023-01-24 NOTE — UTILIZATION REVIEW
Initial Clinical Review    Admission: Date/Time/Statement:   Admission Orders (From admission, onward)     Ordered        01/23/23 1958  Inpatient Admission  Once                      Orders Placed This Encounter   Procedures   • Inpatient Admission     Standing Status:   Standing     Number of Occurrences:   1     Order Specific Question:   Level of Care     Answer:   Med Surg [16]     Order Specific Question:   Estimated length of stay     Answer:   More than 2 Midnights     Order Specific Question:   Certification     Answer:   I certify that inpatient services are medically necessary for this patient for a duration of greater than two midnights  See H&P and MD Progress Notes for additional information about the patient's course of treatment  ED Arrival Information     Expected   1/23/2023     Arrival   1/23/2023 19:01    Acuity   Emergent            Means of arrival   Walk-In    Escorted by   Spouse    Service   OB/GYN    Admission type   Emergency            Arrival complaint   Headache           Chief Complaint   Patient presents with   • Postpartum Complications     Pt 12 days postpartum reporting migraine "worst HA of life" since yesterday  Initial Presentation: 33 y o  female 12 days postpartum also with depression presents to ED as walk-in with new onset of intractable headache and vision changes starting yesterday and worsening  States she had taken Tylenol at home without relief  She also states she recently restarted her zoloft and started a 14 day course of Keflex for newly dx'd mastitis on 1/16  In ED noted to have elevated BP  WBC 15 37, Hgb 10 9, Hct 33 3, Plts 420  K 3 4, Mag 1 7  IV labetalol, IV mag given and mag gtt started in ED  Admit inpatient to L&D unit with Preeclampsia in postpartum period -- continue mag gtt  Labetalol 20 mg IV prn  Continue to monitor Bps  Tylenol, Motrin, and Reglan prn for HA  Continue pta po zoloft and keflex  Reg diet  SCDs  OOB as viviana   CBC, BMP, Mag q 8h    Date: 1/24  Day 2: pt states headache resolved  Continues on Mag gtt  Po meds  Tylenol, Motrin, and Reglan for headache management  Continue to monitor Bps       ED Triage Vitals   Temperature Pulse Respirations Blood Pressure SpO2   01/23/23 1903 01/23/23 1903 01/23/23 1903 01/23/23 1903 01/23/23 1903   97 7 °F (36 5 °C) 67 18 (!) 179/83 99 %      Temp Source Heart Rate Source Patient Position - Orthostatic VS BP Location FiO2 (%)   01/23/23 1903 01/23/23 1903 01/23/23 1903 01/23/23 1903 --   Oral Monitor Sitting Right arm       Pain Score       01/23/23 2035       10 - Worst Possible Pain          Wt Readings from Last 1 Encounters:   01/23/23 68 5 kg (151 lb)     Additional Vital Signs:   Date/Time Temp Pulse Resp BP MAP (mmHg) SpO2 O2 Device Patient Position - Orthostatic VS   01/24/23 0941 -- 70 18 90/50 -- 96 % -- --   01/24/23 0721 98 °F (36 7 °C) 64 -- 105/63 -- 96 % -- --   01/24/23 0600 -- 66 -- 93/55 -- 97 % None (Room air) Lying   01/24/23 0550 -- -- -- 85/42 Abnormal  -- -- -- Sitting   01/24/23 0540 -- -- -- 65/42 Abnormal  -- -- -- Sitting   01/24/23 0400 -- 68 18 101/57 -- 96 % None (Room air) Lying   01/24/23 0200 -- 61 -- 136/75 -- 96 % None (Room air) --   01/24/23 0000 -- -- -- 141/67 -- 97 % None (Room air) --   01/23/23 2320 -- -- -- 125/64 -- -- -- --   01/23/23 2150 -- -- -- 126/67 -- 96 % None (Room air) --   01/23/23 2055 -- -- -- 151/79 -- -- -- --   01/23/23 2035 98 5 °F (36 9 °C) 84 18 174/83 Abnormal  -- -- -- Sitting   01/23/23 1945 97 7 °F (36 5 °C) 63 18 171/79 Abnormal  -- 97 % None (Room air) Sitting   01/23/23 1908 -- -- -- 175/81 Abnormal  121 -- -- Sitting   01/23/23 1903 97 7 °F (36 5 °C) 67 18 179/83 Abnormal  119 99 % None (Room air) Sitting       Pertinent Labs/Diagnostic Test Results:   Results from last 7 days   Lab Units 01/24/23  0537 01/23/23 2052   WBC Thousand/uL 13 23* 15 37*   HEMOGLOBIN g/dL 10 6* 10 9*   HEMATOCRIT % 32 0* 33 3*   PLATELETS Thousands/uL 403* 420*   NEUTROS ABS Thousands/µL 10 17* 12 81*     Results from last 7 days   Lab Units 01/24/23  0537 01/23/23 2052   SODIUM mmol/L 137 141   POTASSIUM mmol/L 2 9* 3 4*   CHLORIDE mmol/L 103 108   CO2 mmol/L 25 23   ANION GAP mmol/L 9 10   BUN mg/dL 8 14   CREATININE mg/dL 0 64 0 74   EGFR ml/min/1 73sq m 119 108   CALCIUM mg/dL 6 7* 8 7   MAGNESIUM mg/dL 6 4* 1 7*     Results from last 7 days   Lab Units 01/24/23  0537 01/23/23 2052   AST U/L 11* 13   ALT U/L 18 20   ALK PHOS U/L 96 99   TOTAL PROTEIN g/dL 6 5 6 8   ALBUMIN g/dL 3 6 3 7   TOTAL BILIRUBIN mg/dL 0 39 0 28     Results from last 7 days   Lab Units 01/24/23  0551   POC GLUCOSE mg/dl 96     Results from last 7 days   Lab Units 01/24/23  0537 01/23/23 2052   GLUCOSE RANDOM mg/dL 116 103     Results from last 7 days   Lab Units 01/23/23  2158   CREATININE UR mg/dL 29 4   PROTEIN UR mg/dL 11   PROT/CREAT RATIO UR  0 37*     ED Treatment:   Medication Administration from 01/23/2023 1901 to 01/24/2023 0857       Date/Time Order Dose Route Action     01/23/2023 2056 EST magnesium sulfate 4 g/100 mL IVPB (premix) 4 g 4 g Intravenous New Bag     01/23/2023 2039 EST magnesium sulfate 2 g/50 mL IVPB (premix) 2 g 2 g Intravenous New Bag     01/24/2023 0738 EST magnesium sulfate 20 g/500 mL infusion (premix) 2 g/hr Intravenous New Bag     01/23/2023 2122 EST magnesium sulfate 20 g/500 mL infusion (premix) 2 g/hr Intravenous New Bag     01/23/2023 2040 EST lactated ringers infusion 50 mL/hr Intravenous New Bag     01/23/2023 2123 EST metoclopramide (REGLAN) injection 10 mg 10 mg Intravenous Given     01/23/2023 2035 EST labetalol (NORMODYNE) injection 20 mg 20 mg Intravenous Given     01/24/2023 0246 EST cephalexin (KEFLEX) capsule 500 mg 500 mg Oral Given     01/23/2023 2146 EST cephalexin (KEFLEX) capsule 500 mg 500 mg Oral Given     01/24/2023 0721 EST acetaminophen (TYLENOL) tablet 975 mg 975 mg Oral Given     01/24/2023 0142 EST acetaminophen (TYLENOL) tablet 975 mg 975 mg Oral Given     01/24/2023 0722 EST potassium chloride (K-DUR,KLOR-CON) CR tablet 40 mEq 40 mEq Oral Given       Admitting Diagnosis: Headache [R51 9]  Age/Sex: 33 y o  female  Admission Orders:  Scheduled Medications:  cephalexin, 500 mg, Oral, Q6H  sertraline, 25 mg, Oral, Daily    Continuous IV Infusions:  lactated ringers, 50 mL/hr, Intravenous, Continuous  magnesium sulfate, 2 g/hr, Intravenous, Continuous    PRN Meds:  acetaminophen, 975 mg, Oral, Q6H PRN 1/24 x2  calcium gluconate, 1 g, Intravenous, Once PRN  ibuprofen, 600 mg, Oral, Q6H PRN  metoclopramide, 10 mg, Intravenous, Q6H PRN 1/23 x1        Network Utilization Review Department  ATTENTION: Please call with any questions or concerns to [phone] and carefully listen to the prompts so that you are directed to the right person  All voicemails are confidential   Elin Buerger all requests for admission clinical reviews, approved or denied determinations and any other requests to dedicated fax number below belonging to the campus where the patient is receiving treatment   List of dedicated fax numbers for the Facilities:  [address] DENIALS (Administrative/Medical Necessity) [phone]   [address] (Maternity/NICU/Pediatrics) [phone]   [address] Alisah [address]manza [phone]   [facility] Mireya [address] [phone]   [address] [place] [address] Th[facility] McguireNew[facility] Hospitalpor[facility]io  [phone]   [facility] [place] [facility] 134 815 [facility] [phone]

## 2023-01-24 NOTE — PROGRESS NOTES
Patient reports minimal improvement in symptoms since Magnesium decreased  Dr Aniya Kennedy made aware  Mag infusion d/c'd per doctor's instruction

## 2023-01-24 NOTE — PLAN OF CARE
Problem: PAIN - ADULT  Goal: Verbalizes/displays adequate comfort level or baseline comfort level  Description: Interventions:  - Encourage patient to monitor pain and request assistance  - Assess pain using appropriate pain scale  - Administer analgesics based on type and severity of pain and evaluate response  - Implement non-pharmacological measures as appropriate and evaluate response  - Consider cultural and social influences on pain and pain management  - Notify physician/advanced practitioner if interventions unsuccessful or patient reports new pain  Outcome: Progressing     Problem: INFECTION - ADULT  Goal: Absence or prevention of progression during hospitalization  Description: INTERVENTIONS:  - Assess and monitor for signs and symptoms of infection  - Monitor lab/diagnostic results  - Monitor all insertion sites, i e  indwelling lines, tubes, and drains  - Monitor endotracheal if appropriate and nasal secretions for changes in amount and color  - [place] appropriate cooling/warming therapies per order  - Administer medications as ordered  - Instruct and encourage patient and family to use good hand hygiene technique  - Identify and instruct in appropriate isolation precautions for identified infection/condition  Outcome: Progressing  Goal: Absence of fever/infection during neutropenic period  Description: INTERVENTIONS:  - Monitor WBC    Outcome: Progressing     Problem: SAFETY ADULT  Goal: Patient will remain free of falls  Description: INTERVENTIONS:  - Educate patient/family on patient safety including physical limitations  - Instruct patient to call for assistance with activity   - Consult OT/PT to assist with strengthening/mobility   - Keep Call bell within reach  - Keep bed low and locked with side rails adjusted as appropriate  - Keep care items and personal belongings within reach  - Initiate and maintain comfort rounds  - Make Fall Risk Sign visible to staff  - Offer Toileting every  Hours, in advance of need  - Initiate/Maintain alarm  - Obtain necessary fall risk management equipment:   - Apply yellow socks and bracelet for high fall risk patients  - Consider moving patient to room near nurses station  Outcome: Progressing  Goal: Maintain or return to baseline ADL function  Description: INTERVENTIONS:  -  Assess patient's ability to carry out ADLs; assess patient's baseline for ADL function and identify physical deficits which impact ability to perform ADLs (bathing, care of mouth/teeth, toileting, grooming, dressing, etc )  - Assess/evaluate cause of self-care deficits   - Assess range of motion  - Assess patient's mobility; develop plan if impaired  - Assess patient's need for assistive devices and provide as appropriate  - Encourage maximum independence but intervene and supervise when necessary  - Involve family in performance of ADLs  - Assess for home care needs following discharge   - Consider OT consult to assist with ADL evaluation and planning for discharge  - Provide patient education as appropriate  Outcome: Progressing  Goal: Maintains/Returns to pre admission functional level  Description: INTERVENTIONS:  - Perform BMAT or MOVE assessment daily    - Set and communicate daily mobility goal to care team and patient/family/caregiver  - Collaborate with rehabilitation services on mobility goals if consulted  - Perform Range of Motion  times a day  - Reposition patient every  hours    - Dangle patient  times a day  - Stand patient  times a day  - Ambulate patient  times a day  - Out of bed to chair  times a day   - Out of bed for meals times a day  - Out of bed for toileting  - Record patient progress and toleration of activity level   Outcome: Progressing     Problem: DISCHARGE PLANNING  Goal: Discharge to home or other facility with appropriate resources  Description: INTERVENTIONS:  - Identify barriers to discharge w/patient and caregiver  - Arrange for needed discharge resources and transportation as appropriate  - Identify discharge learning needs (meds, wound care, etc )  - Arrange for interpretive services to assist at discharge as needed  - Refer to Case Management Department for coordinating discharge planning if the patient needs post-hospital services based on physician/advanced practitioner order or complex needs related to functional status, cognitive ability, or social support system  Outcome: Progressing     Problem: Knowledge Deficit  Goal: Patient/family/caregiver demonstrates understanding of disease process, treatment plan, medications, and discharge instructions  Description: Complete learning assessment and assess knowledge base    Interventions:  - Provide teaching at level of understanding  - Provide teaching via preferred learning methods  Outcome: Progressing

## 2023-01-24 NOTE — PROGRESS NOTES
Progress Note - OB/GYN  Joey Chandra 33 y o  female MRN: [id number]  Unit/Bed#: L&D 313-01 Encounter: [id number]    Assessment and Plan     Joey Chandra is a patient of: [facility]  She is PPD# 13 s/p  vacuum, outlet  Readmitted in the setting of newly diagnosed preeclampsia with severe features  Elevated but non-severe pressures since acute management  Headache resolved  Ongoing magnesium gtt x24h for postpartum management  Depression  Assessment & Plan  Zoloft 25mg qam    Mastitis  Assessment & Plan  Ongoing treatment for mastitis diagnosed on 1/16/23  Keflex 500mg q6h for 14 days (day 9/14)    Status post vacuum-assisted vaginal delivery  Assessment & Plan  VAVD completed on 7/01 without complication  No prior history of pregnancy induced hypertensive disorders    * Preeclampsia in postpartum period  Assessment & Plan  Systolic (13IIT), CDY:351 , Min:101 , UIO:794   Diastolic (97GGU), XGH:89, Min:57, Max:83    Sustained severe range pressures on admission  Denies ongoing headache, chest pain, shortness of breath, acute swelling, right upper quadrant or epigastric pain  Magnesium gtt ongoing  Tylenol, Motrin, and Reglan for headache management  Hypotensive episode this am s/p Valsalva and quickly standing, most likely 2/2 orthostatic pressures and vagal component  Continue to trend pressures  Continue to monitor for evolution of symptoms        Disposition    - Continue inpatient management      Subjective/Objective     Chief Complaint: Postpartum State     Subjective:    Joey Chandra is PPD/POD#13 s/p  vacuum, outlet  Patient was seen earlier this morning with concern for hypotensive episode after using the restroom  Patient was straining and stood up quickly from using the toilet, with resultant pressures 80s over 50s with symptom of feeling like the room was spinning    Patient was assisted back to the bed and after lying down and resting, pressure started to normalize and symptoms resolved  Pain is well controlled  Patient is currently voiding  She is ambulating  Patient is currently passing flatus and has had bowel movement  She is tolerating PO, and denies nausea or vomitting  Patient denies fever, chills, chest pain, shortness of breath, or calf tenderness  Lochia is minimal  She is  Using breast pump  She is recovering well and is stable  Vitals:   /57 (BP Location: Right arm)   Pulse 68   Temp 98 5 °F (36 9 °C) (Axillary)   Resp 18   Ht 5' (1 524 m)   Wt 68 5 kg (151 lb)   LMP 04/09/2022 (Exact Date)   SpO2 96%   BMI 29 49 kg/m²       Intake/Output Summary (Last 24 hours) at 1/24/2023 0610  Last data filed at 1/24/2023 0145  Gross per 24 hour   Intake 400 ml   Output 1350 ml   Net -950 ml       Invasive Devices     Peripheral Intravenous Line  Duration           Peripheral IV 01/23/23 Dorsal (posterior); Left Wrist <1 day                Physical Exam  Vitals and nursing note reviewed  Exam conducted with a chaperone present  Constitutional:       General: She is not in acute distress  HENT:      Head: Normocephalic  Right Ear: External ear normal       Left Ear: External ear normal    Eyes:      General: No scleral icterus  Right eye: No discharge  Left eye: No discharge  Conjunctiva/sclera: Conjunctivae normal    Cardiovascular:      Rate and Rhythm: Normal rate and regular rhythm  Pulses: Normal pulses  Heart sounds: Normal heart sounds  Pulmonary:      Effort: Pulmonary effort is normal  No respiratory distress  Breath sounds: Normal breath sounds  Abdominal:      General: Abdomen is flat  There is no distension  Palpations: Abdomen is soft  Tenderness: There is no abdominal tenderness  There is no guarding  Musculoskeletal:         General: No swelling or tenderness  Normal range of motion  Cervical back: Normal range of motion  Right lower leg: No edema        Left lower leg: No edema    Skin:     General: Skin is warm and dry  Capillary Refill: Capillary refill takes less than 2 seconds  Neurological:      Mental Status: She is alert and oriented to person, place, and time  Mental status is at baseline  Deep Tendon Reflexes:      Reflex Scores:       Bicep reflexes are 1+ on the right side and 1+ on the left side  Brachioradialis reflexes are 1+ on the right side and 1+ on the left side  Patellar reflexes are 1+ on the right side and 1+ on the left side  Psychiatric:         Mood and Affect: Mood normal          Behavior: Behavior normal              Labs:     Hemoglobin   Date Value Ref Range Status   01/24/2023 10 6 (L) 11 5 - 15 4 g/dL Final   01/23/2023 10 9 (L) 11 5 - 15 4 g/dL Final     WBC   Date Value Ref Range Status   01/24/2023 13 23 (H) 4 31 - 10 16 Thousand/uL Final   01/23/2023 15 37 (H) 4 31 - 10 16 Thousand/uL Final     Platelets   Date Value Ref Range Status   01/24/2023 403 (H) 149 - 390 Thousands/uL Final   01/23/2023 420 (H) 149 - 390 Thousands/uL Final     Creatinine   Date Value Ref Range Status   01/23/2023 0 74 0 60 - 1 30 mg/dL Final     Comment:     Standardized to IDMS reference method     AST   Date Value Ref Range Status   01/23/2023 13 13 - 39 U/L Final     Comment:     Specimen collection should occur prior to Sulfasalazine administration due to the potential for falsely depressed results  ALT   Date Value Ref Range Status   01/23/2023 20 7 - 52 U/L Final     Comment:     Specimen collection should occur prior to Sulfasalazine administration due to the potential for falsely depressed results             Ag Quintero MD  1/24/2023  6:10 AM

## 2023-01-24 NOTE — PLAN OF CARE
Problem: PAIN - ADULT  Goal: Verbalizes/displays adequate comfort level or baseline comfort level  Description: Interventions:  - Encourage patient to monitor pain and request assistance  - Assess pain using appropriate pain scale  - Administer analgesics based on type and severity of pain and evaluate response  - Implement non-pharmacological measures as appropriate and evaluate response  - Consider cultural and social influences on pain and pain management  - Notify physician/advanced practitioner if interventions unsuccessful or patient reports new pain  Outcome: Progressing     Problem: INFECTION - ADULT  Goal: Absence or prevention of progression during hospitalization  Description: INTERVENTIONS:  - Assess and monitor for signs and symptoms of infection  - Monitor lab/diagnostic results  - Monitor all insertion sites, i e  indwelling lines, tubes, and drains  - Monitor endotracheal if appropriate and nasal secretions for changes in amount and color  - [place] appropriate cooling/warming therapies per order  - Administer medications as ordered  - Instruct and encourage patient and family to use good hand hygiene technique  - Identify and instruct in appropriate isolation precautions for identified infection/condition  Outcome: Progressing  Goal: Absence of fever/infection during neutropenic period  Description: INTERVENTIONS:  - Monitor WBC    Outcome: Progressing     Problem: SAFETY ADULT  Goal: Patient will remain free of falls  Description: INTERVENTIONS:  - Educate patient/family on patient safety including physical limitations  - Instruct patient to call for assistance with activity   - Consult OT/PT to assist with strengthening/mobility   - Keep Call bell within reach  - Keep bed low and locked with side rails adjusted as appropriate  - Keep care items and personal belongings within reach  - Initiate and maintain comfort rounds  - Apply yellow socks and bracelet for high fall risk patients  - Consider moving patient to room near nurses station  Outcome: Progressing  Goal: Maintain or return to baseline ADL function  Description: INTERVENTIONS:  -  Assess patient's ability to carry out ADLs; assess patient's baseline for ADL function and identify physical deficits which impact ability to perform ADLs (bathing, care of mouth/teeth, toileting, grooming, dressing, etc )  - Assess/evaluate cause of self-care deficits   - Assess range of motion  - Assess patient's mobility; develop plan if impaired  - Assess patient's need for assistive devices and provide as appropriate  - Encourage maximum independence but intervene and supervise when necessary  - Involve family in performance of ADLs  - Assess for home care needs following discharge   - Consider OT consult to assist with ADL evaluation and planning for discharge  - Provide patient education as appropriate  Outcome: Progressing  Goal: Maintains/Returns to pre admission functional level  Description: INTERVENTIONS:  - Perform BMAT or MOVE assessment daily    - Set and communicate daily mobility goal to care team and patient/family/caregiver     - Collaborate with rehabilitation services on mobility goals if consulted  - - Out of bed for toileting  - Record patient progress and toleration of activity level   Outcome: Progressing     Problem: DISCHARGE PLANNING  Goal: Discharge to home or other facility with appropriate resources  Description: INTERVENTIONS:  - Identify barriers to discharge w/patient and caregiver  - Arrange for needed discharge resources and transportation as appropriate  - Identify discharge learning needs (meds, wound care, etc )  - Arrange for interpretive services to assist at discharge as needed  - Refer to Case Management Department for coordinating discharge planning if the patient needs post-hospital services based on physician/advanced practitioner order or complex needs related to functional status, cognitive ability, or social support system  Outcome: Progressing     Problem: Knowledge Deficit  Goal: Patient/family/caregiver demonstrates understanding of disease process, treatment plan, medications, and discharge instructions  Description: Complete learning assessment and assess knowledge base    Interventions:  - Provide teaching at level of understanding  - Provide teaching via preferred learning methods  Outcome: Progressing

## 2023-01-24 NOTE — PROGRESS NOTES
H&P Exam - Gynecology   Pamela Holland 33 y o  female MRN: [id number]  Unit/Bed#: L&D 313-01 Encounter: [id number]    Assessment: Pamela Holland is a 33 y o  female who presents with sustained severe range blood pressures with associated headache and vision changes  New diagnosis of preeclampsia with severe features  Acute management with IV labetalol  Ongoing magnesium gtt  Admitted for inpatient management  Plan:  Depression  Assessment & Plan  Zoloft 25mg qam    Mastitis  Assessment & Plan  Ongoing treatment for mastitis diagnosed on 1/16/23  Keflex 500mg q6h for 14 days (day 8/14)    Status post vacuum-assisted vaginal delivery  Assessment & Plan  VAVD completed on 6/19 without complication  No prior history of pregnancy induced hypertensive disorders    * Preeclampsia in postpartum period  Assessment & Plan  Systolic (03KBO), FKA:816 , Min:144 , LVV:546   Diastolic (25VAI), FFZ:64, Min:79, Max:83    Sustained severe range pressures on admission  Symptoms including intractable headache and blurry vision  Denies chest pain, shortness of breath, acute swelling, right upper quadrant or epigastric pain  Labetalol 20mg IV for acute management  Magnesium gtt  Tylenol, Motrin, and Reglan for headache management  Continue to trend pressures  Continue to monitor for evolution of symptoms      Discussed case and plan w/ Dr Teresa Faith    History of Present Illness   HPI:  Pamela Holland is a 33 y o  female who presents with new onset of intractable headache and vision changes  Patient reports that she has had intractable headache throughout the day but starting at 6 PM tonight she started having blurry vision and throbbing headache  She presented to the ED for further assessment and was found to have sustained severe range blood pressures in the 718V systolic    She was admitted to the postpartum unit, IV access was obtained, and IV labetalol was pushed for acute management with magnesium infusion to follow  Patient reports that she had taken Tylenol for headache but symptoms are refractory to treatment  She also reports that she recently restarted taking Zoloft for history of depression and started a 14-day course of Keflex for newly diagnosed mastitis on 2023  I discussed with patient the new diagnosis of preeclampsia and expected treatment with 24-hour course of magnesium  We discussed monitoring her pressures thereafter for resolution of severe range presentation  Patient expressed understanding and was amenable to treatment plan  All questions were answered at bedside  Review of Systems   Constitutional: Negative for chills and fever  HENT: Negative for congestion, sinus pressure and sinus pain  Eyes: Positive for visual disturbance  Respiratory: Negative for cough, shortness of breath and wheezing  Cardiovascular: Negative for chest pain, palpitations and leg swelling  Gastrointestinal: Negative for abdominal pain, constipation, diarrhea, nausea and vomiting  Genitourinary: Negative for dysuria, vaginal bleeding and vaginal discharge  Skin: Negative for color change, pallor and rash  Neurological: Positive for headaches  Negative for dizziness, weakness and light-headedness  Psychiatric/Behavioral: Negative for agitation and behavioral problems  Historical Information   History reviewed  No pertinent past medical history  Past Surgical History:   Procedure Laterality Date   • HYMENECTOMY     • [place] CONIZATION CERVIX W/WO D&C RPR ELTRD EXC N/A 2017    Procedure: BIOPSY LEEP CERVIX;  Surgeon: Mayuri Gates DO;  Location: [facility];  Service: Gynecology   • TONSILLECTOMY       OB History    Para Term  AB Living   1 1 1     1   SAB IAB Ectopic Multiple Live Births         0 1      # Outcome Date GA Lbr Gavin/2nd Weight Sex Delivery Anes PTL Lv   1 Term 23 39w4d / 02:55 3950 g (8 lb 11 3 oz) F Vag-Vacuum EPI, Local N KAL[place]NA     History reviewed  No pertinent family history  Social History   Social History     Substance and Sexual Activity   Alcohol Use Yes    Comment: social      Social History     Substance and Sexual Activity   Drug Use No     Social History     Tobacco Use   Smoking Status Every Day   Smokeless Tobacco Not on file       Meds/Allergies   Medications Prior to Admission   Medication   • acetaminophen (TYLENOL) 325 mg tablet   • benzocaine-menthol-lanolin-aloe (DERMOPLAST) 20-0 5 % topical spray   • docusate sodium (COLACE) 100 mg capsule   • guaiFENesin (MUCINEX) 600 mg 12 hr tablet   • ibuprofen (MOTRIN) 600 mg tablet   • witch hazel-glycerin (TUCKS) topical pad     Allergies   Allergen Reactions   • Codeine        Objective   /70   Pulse 63   Temp 97 7 °F (36 5 °C) (Oral)   Resp 18   Wt 68 7 kg (151 lb 7 3 oz)   LMP 04/09/2022 (Exact Date)   SpO2 97%   BMI 29 58 kg/m²     No intake or output data in the 24 hours ending 01/23/23 2116    Invasive Devices: Invasive Devices     Peripheral Intravenous Line  Duration           Peripheral IV 01/23/23 Dorsal (posterior); Left Wrist <1 day                Physical Exam  Vitals and nursing note reviewed  Exam conducted with a chaperone present  Constitutional:       General: She is not in acute distress  HENT:      Head: Normocephalic  Right Ear: External ear normal       Left Ear: External ear normal    Eyes:      General: No scleral icterus  Right eye: No discharge  Left eye: No discharge  Conjunctiva/sclera: Conjunctivae normal       Comments: Peripheral visual fields intact by confrontation   Cardiovascular:      Rate and Rhythm: Normal rate and regular rhythm  Pulses: Normal pulses  Heart sounds: Normal heart sounds  Pulmonary:      Effort: Pulmonary effort is normal  No respiratory distress  Breath sounds: Normal breath sounds  Abdominal:      Palpations: Abdomen is soft  Tenderness: There is no abdominal tenderness   There is no guarding  Musculoskeletal:         General: No swelling or tenderness  Normal range of motion  Cervical back: Normal range of motion  Right lower leg: No edema  Left lower leg: No edema  Skin:     General: Skin is warm and dry  Capillary Refill: Capillary refill takes less than 2 seconds  Neurological:      General: No focal deficit present  Mental Status: She is alert and oriented to person, place, and time  Mental status is at baseline  Deep Tendon Reflexes:      Reflex Scores:       Bicep reflexes are 2+ on the right side and 2+ on the left side  Brachioradialis reflexes are 2+ on the right side and 2+ on the left side  Patellar reflexes are 2+ on the right side and 2+ on the left side  Psychiatric:         Mood and Affect: Mood normal          Behavior: Behavior normal          Lab Results:   Admission on 01/23/2023   Component Date Value   • WBC 01/23/2023 15 37 (H)    • RBC 01/23/2023 3 73 (L)    • Hemoglobin 01/23/2023 10 9 (L)    • Hematocrit 01/23/2023 33 3 (L)    • MCV 01/23/2023 89    • MCH 01/23/2023 29 2    • MCHC 01/23/2023 32 7    • RDW 01/23/2023 14 1    • MPV 01/23/2023 10 5    • Platelets 53/94/0362 420 (H)    • nRBC 01/23/2023 0    • Neutrophils Relative 01/23/2023 84 (H)    • Immat GRANS % 01/23/2023 1    • Lymphocytes Relative 01/23/2023 12 (L)    • Monocytes Relative 01/23/2023 3 (L)    • Eosinophils Relative 01/23/2023 0    • Basophils Relative 01/23/2023 0    • Neutrophils Absolute 01/23/2023 12 81 (H)    • Immature Grans Absolute 01/23/2023 0 10    • Lymphocytes Absolute 01/23/2023 1 84    • Monocytes Absolute 01/23/2023 0 50    • Eosinophils Absolute 01/23/2023 0 06    • Basophils Absolute 01/23/2023 0 06       Imaging: I have personally reviewed pertinent reports  EKG, Pathology, and Other Studies: I have personally reviewed pertinent reports        Code Status: Prior  Advance Directive and Living Will:      Power of :    POLST: Cecilia Restrepo MD  1/23/2023  9:16 PM

## 2023-01-25 VITALS
SYSTOLIC BLOOD PRESSURE: 124 MMHG | BODY MASS INDEX: 29.64 KG/M2 | OXYGEN SATURATION: 96 % | WEIGHT: 151 LBS | HEART RATE: 55 BPM | RESPIRATION RATE: 18 BRPM | HEIGHT: 60 IN | DIASTOLIC BLOOD PRESSURE: 59 MMHG | TEMPERATURE: 98 F

## 2023-01-25 LAB
ANION GAP SERPL CALCULATED.3IONS-SCNC: 7 MMOL/L (ref 4–13)
BUN SERPL-MCNC: 11 MG/DL (ref 5–25)
CALCIUM SERPL-MCNC: 8.1 MG/DL (ref 8.4–10.2)
CHLORIDE SERPL-SCNC: 108 MMOL/L (ref 96–108)
CO2 SERPL-SCNC: 26 MMOL/L (ref 21–32)
CREAT SERPL-MCNC: 0.7 MG/DL (ref 0.6–1.3)
ERYTHROCYTE [DISTWIDTH] IN BLOOD BY AUTOMATED COUNT: 14.4 % (ref 11.6–15.1)
GFR SERPL CREATININE-BSD FRML MDRD: 115 ML/MIN/1.73SQ M
GLUCOSE SERPL-MCNC: 77 MG/DL (ref 65–140)
HCT VFR BLD AUTO: 32.4 % (ref 34.8–46.1)
HGB BLD-MCNC: 10.3 G/DL (ref 11.5–15.4)
MCH RBC QN AUTO: 28.5 PG (ref 26.8–34.3)
MCHC RBC AUTO-ENTMCNC: 31.8 G/DL (ref 31.4–37.4)
MCV RBC AUTO: 90 FL (ref 82–98)
PLATELET # BLD AUTO: 392 THOUSANDS/UL (ref 149–390)
PMV BLD AUTO: 10 FL (ref 8.9–12.7)
POTASSIUM SERPL-SCNC: 3.6 MMOL/L (ref 3.5–5.3)
RBC # BLD AUTO: 3.61 MILLION/UL (ref 3.81–5.12)
SODIUM SERPL-SCNC: 141 MMOL/L (ref 135–147)
WBC # BLD AUTO: 8.63 THOUSAND/UL (ref 4.31–10.16)

## 2023-01-25 RX ORDER — SERTRALINE HYDROCHLORIDE 25 MG/1
25 TABLET, FILM COATED ORAL DAILY
Refills: 0
Start: 2023-01-25

## 2023-01-25 RX ORDER — IBUPROFEN 200 MG
600 TABLET ORAL EVERY 6 HOURS PRN
Start: 2023-01-25

## 2023-01-25 RX ORDER — ACETAMINOPHEN 325 MG/1
975 TABLET ORAL EVERY 6 HOURS PRN
Refills: 0
Start: 2023-01-25

## 2023-01-25 RX ADMIN — ACETAMINOPHEN 975 MG: 325 TABLET ORAL at 02:30

## 2023-01-25 RX ADMIN — SERTRALINE HYDROCHLORIDE 25 MG: 50 TABLET ORAL at 09:43

## 2023-01-25 RX ADMIN — CEPHALEXIN 500 MG: 500 CAPSULE ORAL at 09:43

## 2023-01-25 RX ADMIN — ACETAMINOPHEN 975 MG: 325 TABLET ORAL at 09:43

## 2023-01-25 RX ADMIN — IBUPROFEN 600 MG: 600 TABLET, FILM COATED ORAL at 07:17

## 2023-01-25 RX ADMIN — CEPHALEXIN 500 MG: 500 CAPSULE ORAL at 04:30

## 2023-01-25 NOTE — PLAN OF CARE
Problem: PAIN - ADULT  Goal: Verbalizes/displays adequate comfort level or baseline comfort level  Description: Interventions:  - Encourage patient to monitor pain and request assistance  - Assess pain using appropriate pain scale  - Administer analgesics based on type and severity of pain and evaluate response  - Implement non-pharmacological measures as appropriate and evaluate response  - Consider cultural and social influences on pain and pain management  - Notify physician/advanced practitioner if interventions unsuccessful or patient reports new pain  Outcome: Progressing     Problem: INFECTION - ADULT  Goal: Absence or prevention of progression during hospitalization  Description: INTERVENTIONS:  - Assess and monitor for signs and symptoms of infection  - Monitor lab/diagnostic results  - Monitor all insertion sites, i e  indwelling lines, tubes, and drains  - Monitor endotracheal if appropriate and nasal secretions for changes in amount and color  - [place] appropriate cooling/warming therapies per order  - Administer medications as ordered  - Instruct and encourage patient and family to use good hand hygiene technique  - Identify and instruct in appropriate isolation precautions for identified infection/condition  Outcome: Progressing  Goal: Absence of fever/infection during neutropenic period  Description: INTERVENTIONS:  - Monitor WBC    Outcome: Progressing     Problem: SAFETY ADULT  Goal: Patient will remain free of falls  Description: INTERVENTIONS:  - Educate patient/family on patient safety including physical limitations  - Instruct patient to call for assistance with activity   - Consult OT/PT to assist with strengthening/mobility   - Keep Call bell within reach  - Keep bed low and locked with side rails adjusted as appropriate  - Keep care items and personal belongings within reach  - Initiate and maintain comfort rounds  - Make Fall Risk Sign visible to staff  - Apply yellow socks and bracelet for high fall risk patients  - Consider moving patient to room near nurses station  Outcome: Progressing  Goal: Maintain or return to baseline ADL function  Description: INTERVENTIONS:  -  Assess patient's ability to carry out ADLs; assess patient's baseline for ADL function and identify physical deficits which impact ability to perform ADLs (bathing, care of mouth/teeth, toileting, grooming, dressing, etc )  - Assess/evaluate cause of self-care deficits   - Assess range of motion  - Assess patient's mobility; develop plan if impaired  - Assess patient's need for assistive devices and provide as appropriate  - Encourage maximum independence but intervene and supervise when necessary  - Involve family in performance of ADLs  - Assess for home care needs following discharge   - Consider OT consult to assist with ADL evaluation and planning for discharge  - Provide patient education as appropriate  Outcome: Progressing  Goal: Maintains/Returns to pre admission functional level  Description: INTERVENTIONS:  - Perform BMAT or MOVE assessment daily    - Set and communicate daily mobility goal to care team and patient/family/caregiver     - Collaborate with rehabilitation services on mobility goals if consulted  - Out of bed for toileting  - Record patient progress and toleration of activity level   Outcome: Progressing     Problem: DISCHARGE PLANNING  Goal: Discharge to home or other facility with appropriate resources  Description: INTERVENTIONS:  - Identify barriers to discharge w/patient and caregiver  - Arrange for needed discharge resources and transportation as appropriate  - Identify discharge learning needs (meds, wound care, etc )  - Arrange for interpretive services to assist at discharge as needed  - Refer to Case Management Department for coordinating discharge planning if the patient needs post-hospital services based on physician/advanced practitioner order or complex needs related to functional status, cognitive ability, or social support system  Outcome: Progressing     Problem: Knowledge Deficit  Goal: Patient/family/caregiver demonstrates understanding of disease process, treatment plan, medications, and discharge instructions  Description: Complete learning assessment and assess knowledge base    Interventions:  - Provide teaching at level of understanding  - Provide teaching via preferred learning methods  Outcome: Progressing

## 2023-01-25 NOTE — LACTATION NOTE
01/25/23 1100   Lactation Consultation   Reason for Consult 20;20 m;20 mins   Maternal Information   Has mother  before? No   Exclusive Pump and Bottle Feed Yes   Breasts/Nipples   Date Pumping Initiated 01/11/23   Left Breast Tender;Soft; Other (Comment)  (hardened areas)   Right Breast Soft;Tender   Breastfeeding Status Yes   Breastfeeding Progress Pumping only   Reasons for not Breastfeeding Maternal preference   Patient Follow-Up   Lactation Consult Status 2   Follow-Up Type Inpatient;Call as needed   Other OB Lactation Documentation    Additional Problem Noted Discussed ways to manage over production of breast milk  Infant is 3weeks old and not back to birth weight yet  C/O breast pain/engorgement/mastitis treatment day 10 with PO ABX  Rosaura Pérez had been advised to pump through 2 [facility] 6 times a day to help slow overproduction  She is still collecting over 30 to 50 ounces in a 24 hour period  She already has 240 ounces of EBM in the freezer  Infant is 3weeks old  Rosaura Pérez had been using warm/hot compresses before pumping  Due to over production, discussed cool compress and rest     Discussed other ways to regulate breast milk production including    Stopping pumping when reaching 3 ounces if pumping 10 times in a day to more slowly titrate downward production comfortably  Pumping 6 times in a day, but stopping when reaching about 5 ounces  LER Drying Up Breast  out given for ideas to discuss and try including mint tea and cool compresses, but not all of them at once  Encouraged her to discuss with OBGyn if she plans on trying to use sudafed due to her history of elevated blood pressures   Discussed [employer] Academy of Breastfeeding [employer] Protocol #36    Discussed use of sunflower lecithin granules to assist with softening breasts due to blocked milk ducts (encouraged her to check with pediatrician on this use) Discussed use of [facility] for therapeutic breast ultrasound for breast pain  Encouraged ongoing contact with outpatient lactation consultant  Discussed and provided contact information for [facility] for EBM donation  Encouraged parents to call for assistance, questions, and concerns about breastfeeding  Extension provided

## 2023-01-25 NOTE — PROGRESS NOTES
Progress Note - OB/GYN   Dannie Arriaga 33 y o  female MRN: [id number]  Unit/Bed#: L&D 313-01 Encounter: [id number]    Assessment: Patient of: [facility] she is post partum day #14 s/p VAVD readmitted with preeclampsia with severe features now s/p magnesium  Plan:    * Preeclampsia in postpartum period  Assessment & Plan  Systolic (10MCS), HAY:913 , Min:90 , QPR:145   Diastolic (35DNR), PFE:50, Min:50, Max:78  Reports mild headache last night, no vision changes  She does report she has new glasses  Denies  chest pain, shortness of breath, acute swelling, right upper quadrant or epigastric pain  S/p 12h magnesium   Tylenol, Motrin, and Reglan for headache management  Continue to trend pressures  Continue to monitor for evolution of symptoms    Depression  Assessment & Plan  Zoloft 25mg qam    Mastitis  Assessment & Plan  Ongoing treatment for mastitis diagnosed on 1/16/23  Keflex 500mg q6h for 14 days (day 10/14)  WBC continues to trend downward     Lab Results   Component Value Date/Time    WBC 8 63 01/25/2023 05:45 AM    WBC 13 23 (H) 01/24/2023 05:37 AM    WBC 15 37 (H) 01/23/2023 08:52 PM         Status post vacuum-assisted vaginal delivery  Assessment & Plan  VAVD completed on 7/39 without complication  No prior history of pregnancy induced hypertensive disorders         Disposition    - Anticipate discharge home today             Subjective/Objective     Chief Complaint: Postpartum State    Subjective: Dannie Arriaga is PPD14 s/p VAVD readmitted with severe preeclampsia in postpartum period  She reports mild headache no vision changes  Denies chest pain, shortness of breath, RUQ /epgiastric pain  Minimal lochia  She is using breast pump  Denies fever and chills  Still on ABX for mastitis          Objective:   Vitals:   /55 (BP Location: Right arm)   Pulse 63   Temp 97 6 °F (36 4 °C) (Temporal)   Resp 18   Ht 5' (1 524 m)   Wt 68 5 kg (151 lb)   LMP 04/09/2022 (Exact Date) SpO2 97%   Breastfeeding Yes   BMI 29 49 kg/m²   Body mass index is 29 49 kg/m²  I/O       01/23 0701 01/24 0700 01/24 0701 01/25 0700    P  O  400     Total Intake(mL/kg) 400 (5 8)     Urine (mL/kg/hr) 1750 1350 (0 8)    Total Output 1750 1350    Net -1350 -1350              Lab Results   Component Value Date    WBC 8 63 01/25/2023    HGB 10 3 (L) 01/25/2023    HCT 32 4 (L) 01/25/2023    MCV 90 01/25/2023     (H) 01/25/2023       Meds/Allergies   Current Facility-Administered Medications   Medication Dose Route Frequency   • acetaminophen (TYLENOL) tablet 975 mg  975 mg Oral Q6H PRN   • calcium gluconate 10 % injection 1 g  1 g Intravenous Once PRN   • cephalexin (KEFLEX) capsule 500 mg  500 mg Oral Q6H   • ibuprofen (MOTRIN) tablet 600 mg  600 mg Oral Q6H PRN   • lactated ringers infusion  50 mL/hr Intravenous Continuous   • metoclopramide (REGLAN) injection 10 mg  10 mg Intravenous Q6H PRN   • sertraline (ZOLOFT) tablet 25 mg  25 mg Oral Daily       Physical Exam:  General Appearance: Awake, alert and not in acute distress     Pulm: Effort normal   GI: Soft, non-tender to palpation in all four quadrants  Extremities: no lower extremity edema       Thang Hopkins MD  OBGYN PGY-3  6:49 AM  1/25/2023

## 2023-01-25 NOTE — PLAN OF CARE
Problem: PAIN - ADULT  Goal: Verbalizes/displays adequate comfort level or baseline comfort level  Description: Interventions:  - Encourage patient to monitor pain and request assistance  - Assess pain using appropriate pain scale  - Administer analgesics based on type and severity of pain and evaluate response  - Implement non-pharmacological measures as appropriate and evaluate response  - Consider cultural and social influences on pain and pain management  - Notify physician/advanced practitioner if interventions unsuccessful or patient reports new pain  Outcome: Progressing     Problem: INFECTION - ADULT  Goal: Absence or prevention of progression during hospitalization  Description: INTERVENTIONS:  - Assess and monitor for signs and symptoms of infection  - Monitor lab/diagnostic results  - Monitor all insertion sites, i e  indwelling lines, tubes, and drains  - Monitor endotracheal if appropriate and nasal secretions for changes in amount and color  - [place] appropriate cooling/warming therapies per order  - Administer medications as ordered  - Instruct and encourage patient and family to use good hand hygiene technique  - Identify and instruct in appropriate isolation precautions for identified infection/condition  Outcome: Progressing  Goal: Absence of fever/infection during neutropenic period  Description: INTERVENTIONS:  - Monitor WBC    Outcome: Progressing     Problem: SAFETY ADULT  Goal: Patient will remain free of falls  Description: INTERVENTIONS:  - Educate patient/family on patient safety including physical limitations  - Instruct patient to call for assistance with activity   - Consult OT/PT to assist with strengthening/mobility   - Keep Call bell within reach  - Keep bed low and locked with side rails adjusted as appropriate  - Keep care items and personal belongings within reach  - Initiate and maintain comfort rounds  - Make Fall Risk Sign visible to staff  - Offer Toileting every Hours, in advance of need  - Initiate/Maintain alarm  - Obtain necessary fall risk management equipment:   - Apply yellow socks and bracelet for high fall risk patients  - Consider moving patient to room near nurses station  Outcome: Progressing  Goal: Maintain or return to baseline ADL function  Description: INTERVENTIONS:  -  Assess patient's ability to carry out ADLs; assess patient's baseline for ADL function and identify physical deficits which impact ability to perform ADLs (bathing, care of mouth/teeth, toileting, grooming, dressing, etc )  - Assess/evaluate cause of self-care deficits   - Assess range of motion  - Assess patient's mobility; develop plan if impaired  - Assess patient's need for assistive devices and provide as appropriate  - Encourage maximum independence but intervene and supervise when necessary  - Involve family in performance of ADLs  - Assess for home care needs following discharge   - Consider OT consult to assist with ADL evaluation and planning for discharge  - Provide patient education as appropriate  Outcome: Progressing  Goal: Maintains/Returns to pre admission functional level  Description: INTERVENTIONS:  - Perform BMAT or MOVE assessment daily    - Set and communicate daily mobility goal to care team and patient/family/caregiver  - Collaborate with rehabilitation services on mobility goals if consulted  - Perform Range of Motion  times a day  - Reposition patient every  hours    - Dangle patient  times a day  - Stand patient  times a day  - Ambulate patient  times a day  - Out of bed to chair  times a day   - Out of bed for meals times a day  - Out of bed for toileting  - Record patient progress and toleration of activity level   Outcome: Progressing     Problem: DISCHARGE PLANNING  Goal: Discharge to home or other facility with appropriate resources  Description: INTERVENTIONS:  - Identify barriers to discharge w/patient and caregiver  - Arrange for needed discharge resources and transportation as appropriate  - Identify discharge learning needs (meds, wound care, etc )  - Arrange for interpretive services to assist at discharge as needed  - Refer to Case Management Department for coordinating discharge planning if the patient needs post-hospital services based on physician/advanced practitioner order or complex needs related to functional status, cognitive ability, or social support system  Outcome: Progressing     Problem: Knowledge Deficit  Goal: Patient/family/caregiver demonstrates understanding of disease process, treatment plan, medications, and discharge instructions  Description: Complete learning assessment and assess knowledge base    Interventions:  - Provide teaching at level of understanding  - Provide teaching via preferred learning methods  Outcome: Progressing

## 2023-01-31 ENCOUNTER — TELEMEDICINE (OUTPATIENT)
Dept: BEHAVIORAL/MENTAL HEALTH CLINIC | Facility: CLINIC | Age: 33
End: 2023-01-31

## 2023-01-31 DIAGNOSIS — F41.9 ANXIETY: Primary | ICD-10-CM

## 2023-01-31 NOTE — PSYCH
Virtual Regular Visit    Verification of patient location:    Patient is located in the following state in which I hold an active license [place]      Assessment/Plan:    Problem List Items Addressed This Visit    None  Visit Diagnoses     Anxiety    -  Primary          Goals addressed in session: Goal 1          Reason for visit is No chief complaint on file  Encounter provider Smiley Webb LCSW    Provider located at [address] [facility] [address]   [address] [facility] [id number] [phone]      Recent Visits  No visits were found meeting these conditions  Showing recent visits within past 7 days and meeting all other requirements  Today's Visits  Date Type Provider Dept   01/31/23 Telemedicine Smiley Webb LCSW [facility]   Showing today's visits and meeting all other requirements  Future Appointments  No visits were found meeting these conditions  Showing future appointments within next 150 days and meeting all other requirements       The patient was identified by name and date of birth  Tamir Ortiz was informed that this is a telemedicine visit and that the visit is being conducted throughthe [employer] platform  She agrees to proceed     My office door was closed  No one else was in the room  She acknowledged consent and understanding of privacy and security of the video platform  The patient has agreed to participate and understands they can discontinue the visit at any time  Patient is aware this is a billable service  Subjective  Tamir Ortiz is a 33 y o  female who presents for follow up therapy session   Pt reported that she is "feeling better "  She is sleeping 4 hours at a time at night, her appetite has returned, she is accepting of help from family, have less irrational thoughts and she is pumping less    She did have an episode of pre-eclampsia last week and was in the hospital from Monday evening to Wednesday afternoon  During her hospital stay she produced 110 ounces of breast milk  She was encouraged to reduce her pumping to every 3-4 hours  She has continued this at home and is doing well  The baby has a weight check on Friday as she still is not back to her birth weight  Pt was advised to supplement with formula which did but reported the baby did not respond well to this  Pt resumed all breast milk in the bottles  Pt feels she is using her own judgement now and is becoming more comfortable with the baby, etc        HPI     No past medical history on file      Past Surgical History:   Procedure Laterality Date   • HYMENECTOMY     • TX CONIZATION CERVIX W/WO D&C RPR ELTRD EXC N/A 7/20/2017    Procedure: BIOPSY LEEP CERVIX;  Surgeon: Kasey Cannon DO;  Location: [facility] OR;  Service: Gynecology   • TONSILLECTOMY         Current Outpatient Medications   Medication Sig Dispense Refill   • acetaminophen (TYLENOL) 325 mg tablet Take 2 tablets (650 mg total) by mouth every 4 (four) hours as needed for mild pain  0   • acetaminophen (TYLENOL) 325 mg tablet Take 3 tablets (975 mg total) by mouth every 6 (six) hours as needed for mild pain  0   • benzocaine-menthol-lanolin-aloe (DERMOPLAST) 20-0 5 % topical spray Apply 1 application topically every 6 (six) hours as needed for mild pain or irritation  0   • docusate sodium (COLACE) 100 mg capsule Take 1 capsule (100 mg total) by mouth 2 (two) times a day  0   • guaiFENesin (MUCINEX) 600 mg 12 hr tablet Take 1 tablet (600 mg total) by mouth every 12 (twelve) hours  0   • ibuprofen (MOTRIN) 200 mg tablet Take 3 tablets (600 mg total) by mouth every 6 (six) hours as needed for moderate pain     • ibuprofen (MOTRIN) 600 mg tablet Take 1 tablet (600 mg total) by mouth every 6 (six) hours 30 tablet 0   • sertraline (ZOLOFT) 25 mg tablet Take 1 tablet (25 mg total) by mouth daily  0   • witch hazel-glycerin (TUCKS) topical pad Apply 1 pad topically every 4 (four) hours as needed for irritation  0     No current facility-administered medications for this visit  Allergies   Allergen Reactions   • Codeine        Review of Systems    Video Exam    There were no vitals filed for this visit  Physical Exam     Behavioral Health Psychotherapy Progress Note    Psychotherapy Provided: Individual Psychotherapy     1  Anxiety            Goals addressed in session: Goal 1     DATA:   During this session, this clinician used the following therapeutic modalities: Engagement Strategies and Supportive Psychotherapy    Substance Abuse was not addressed during this session  If the client is diagnosed with a co-occurring substance use disorder, please indicate any changes in the frequency or amount of use: na  Stage of change for addressing substance use diagnoses: No substance use/Not applicable    ASSESSMENT:  Yoel Anthony presents with a Euthymic/ normal mood  her affect is Normal range and intensity, which is congruent, with her mood and the content of the session  The client has made progress on their goals  Yoel Anthony presents with a none risk of suicide, none risk of self-harm, and none risk of harm to others  For any risk assessment that surpasses a "low" rating, a safety plan must be developed  A safety plan was indicated: no  If yes, describe in detail na      PLAN: Between sessions, Yoel Anthony will continue to utilize coping skills  At the next session, the therapist will use Engagement Strategies and Supportive Psychotherapy to address anxiety  Behavioral Health Treatment Plan and Discharge Planning: Yoel Anthony is aware of and agrees to continue to work on their treatment plan  They have identified and are working toward their discharge goals   yes    Visit start and stop times:    01/31/23  Start Time: 1000  Stop Time: 1021  Total Visit Time: 21 minutes

## 2023-02-21 ENCOUNTER — TELEMEDICINE (OUTPATIENT)
Dept: BEHAVIORAL/MENTAL HEALTH CLINIC | Facility: CLINIC | Age: 33
End: 2023-02-21

## 2023-02-21 DIAGNOSIS — F41.9 ANXIETY: Primary | ICD-10-CM

## 2023-02-21 NOTE — PSYCH
Virtual Regular Visit    Verification of patient location:    Patient is located in the following state in which I hold an active license [place]      Assessment/Plan:    Problem List Items Addressed This Visit    None  Visit Diagnoses     Anxiety    -  Primary          Goals addressed in session: Goal 1          Reason for visit is No chief complaint on file  Encounter provider Alek Shen LCSW    Provider located at [address] [facility] [address]   [address] [facility] [id number] [phone]      Recent Visits  No visits were found meeting these conditions  Showing recent visits within past 7 days and meeting all other requirements  Today's Visits  Date Type Provider Dept   02/21/23 Telemedicine Alek Shen LCSW [facility]   Showing today's visits and meeting all other requirements  Future Appointments  No visits were found meeting these conditions  Showing future appointments within next 150 days and meeting all other requirements       The patient was identified by name and date of birth  Landon Beavers was informed that this is a telemedicine visit and that the visit is being conducted throughthe [employer]  She agrees to proceed     My office door was closed  No one else was in the room  She acknowledged consent and understanding of privacy and security of the video platform  The patient has agreed to participate and understands they can discontinue the visit at any time  Patient is aware this is a billable service  Subjective  Landon Beavers is a 33 y o  female who presents for follow up therapy session   Pt reported that she has been "okay "  She explained that her breast ducts are clogged again and this is causing her to be uncomfortable  Pt is also experiencing some pelvic pain  She will be calling the OB after this session    Otherwise, the dog is doing well and is now on Paxil for his anxiety  He is adjusting to having the baby in the home  The baby is doing well and gaining weight  Emotionally, pt feels has been doing "pretty good "  Her only concern at this time is thinking about how she and her  wanted to try for 2 or 3 children and after having a traumatic pregnancy/delivery she is questioning this  She is hyper-focused on these thoughts several times a day  She spoke to her  about this who is okay with only having one child  Processed pt's feelings about this and discussed that this is normal for her to be feeling this way and she does not need to make any decisions at this time  Pt is getting a lot of help from family with the baby  She and her  have been able to spend alone time together  Pt's appetite is good and her sleep is generally good as well  Pt has been crafting more recently and is enjoying this  Discussed follow up in one month and if she is stable, will discharge from therapy  HPI     No past medical history on file      Past Surgical History:   Procedure Laterality Date   • HYMENECTOMY     • ID CONIZATION CERVIX W/WO D&C RPR ELTRD EXC N/A 7/20/2017    Procedure: BIOPSY LEEP CERVIX;  Surgeon: Darion Jeffries DO;  Location: [facility];  Service: Gynecology   • TONSILLECTOMY         Current Outpatient Medications   Medication Sig Dispense Refill   • acetaminophen (TYLENOL) 325 mg tablet Take 2 tablets (650 mg total) by mouth every 4 (four) hours as needed for mild pain  0   • acetaminophen (TYLENOL) 325 mg tablet Take 3 tablets (975 mg total) by mouth every 6 (six) hours as needed for mild pain  0   • benzocaine-menthol-lanolin-aloe (DERMOPLAST) 20-0 5 % topical spray Apply 1 application topically every 6 (six) hours as needed for mild pain or irritation  0   • docusate sodium (COLACE) 100 mg capsule Take 1 capsule (100 mg total) by mouth 2 (two) times a day  0   • guaiFENesin (MUCINEX) 600 mg 12 hr tablet Take 1 tablet (600 mg total) by mouth every 12 (twelve) hours  0   • ibuprofen (MOTRIN) 200 mg tablet Take 3 tablets (600 mg total) by mouth every 6 (six) hours as needed for moderate pain     • ibuprofen (MOTRIN) 600 mg tablet Take 1 tablet (600 mg total) by mouth every 6 (six) hours 30 tablet 0   • sertraline (ZOLOFT) 25 mg tablet Take 1 tablet (25 mg total) by mouth daily  0   • witch hazel-glycerin (TUCKS) topical pad Apply 1 pad topically every 4 (four) hours as needed for irritation  0     No current facility-administered medications for this visit  Allergies   Allergen Reactions   • Codeine        Review of Systems: Pt was pleasant, cooperative and engaged  Video Exam    There were no vitals filed for this visit  Physical Exam     Behavioral Health Psychotherapy Progress Note    Psychotherapy Provided: Individual Psychotherapy     1  Anxiety            Goals addressed in session: Goal 1     DATA:   During this session, this clinician used the following therapeutic modalities: Engagement Strategies and Supportive Psychotherapy    Substance Abuse was not addressed during this session  If the client is diagnosed with a co-occurring substance use disorder, please indicate any changes in the frequency or amount of use: na  Stage of change for addressing substance use diagnoses: No substance use/Not applicable    ASSESSMENT:  Mahesh Ureña presents with a Euthymic/ normal mood  her affect is Normal range and intensity, which is congruent, with her mood and the content of the session  The client has made progress on their goals  Mahesh Ureña presents with a none risk of suicide, none risk of self-harm, and none risk of harm to others  For any risk assessment that surpasses a "low" rating, a safety plan must be developed      A safety plan was indicated: no  If yes, describe in detail na    PLAN: Between sessions, Mahesh Ureña will continue to engage in positive self care and anxiety reducing coping skills  At the next session, the therapist will use Engagement Strategies and Supportive Psychotherapy to address anxiety  Behavioral Health Treatment Plan and Discharge Planning: María Mcmanus is aware of and agrees to continue to work on their treatment plan  They have identified and are working toward their discharge goals   yes    Visit start and stop times:    02/21/23  Start Time: 0951  Stop Time: 1016  Total Visit Time: 25 minutes

## 2023-03-21 ENCOUNTER — TELEMEDICINE (OUTPATIENT)
Dept: BEHAVIORAL/MENTAL HEALTH CLINIC | Facility: CLINIC | Age: 33
End: 2023-03-21

## 2023-03-21 DIAGNOSIS — F41.9 ANXIETY: Primary | ICD-10-CM

## 2023-03-21 NOTE — PSYCH
Virtual Regular Visit    Verification of patient location:    Patient is located in the following state in which I hold an active license [place]      Assessment/Plan:    Problem List Items Addressed This Visit    None  Visit Diagnoses     Anxiety    -  Primary          Goals addressed in session: Goal 1          Reason for visit is No chief complaint on file  Encounter provider Tiny Mckeon LCSW    Provider located at [address] [facility] [address]   [address] [address]  [phone]      Recent Visits  No visits were found meeting these conditions  Showing recent visits within past 7 days and meeting all other requirements  Today's Visits  Date Type Provider Dept   03/21/23 Telemedicine Tiny Mckeon LCSW [facility]   Showing today's visits and meeting all other requirements  Future Appointments  No visits were found meeting these conditions  Showing future appointments within next 150 days and meeting all other requirements       The patient was identified by name and date of birth  Jim Beltran was informed that this is a telemedicine visit and that the visit is being conducted throughthe [employer] platform  She agrees to proceed     My office door was closed  No one else was in the room  She acknowledged consent and understanding of privacy and security of the video platform  The patient has agreed to participate and understands they can discontinue the visit at any time  Patient is aware this is a billable service  Subjective  Jim Beltran is a 33 y o  female who presents for follow up therapy session  Pt reported that she has been "good "  Pt is back on her ADHD medication and she is finding this to be helpful  Pt is getting regular time for herself  She and her  are also getting time out together  Pt is enjoying the baby more every day and is feeling "angel  " Pt's  is doing well also  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  She is back on birth control and this makes her feel better  The family dog is doing well and there are no more issues with him  Overall, pt is feeling well and will be discharged at this time  She may call to resume therapy at any time  HPI     No past medical history on file  Past Surgical History:   Procedure Laterality Date   • HYMENECTOMY     • CO CONIZATION CERVIX W/WO D&C RPR ELTRD EXC N/A 7/20/2017    Procedure: BIOPSY LEEP CERVIX;  Surgeon: Margarito De León DO;  Location: [facility];  Service: Gynecology   • TONSILLECTOMY         Current Outpatient Medications   Medication Sig Dispense Refill   • acetaminophen (TYLENOL) 325 mg tablet Take 2 tablets (650 mg total) by mouth every 4 (four) hours as needed for mild pain  0   • acetaminophen (TYLENOL) 325 mg tablet Take 3 tablets (975 mg total) by mouth every 6 (six) hours as needed for mild pain  0   • benzocaine-menthol-lanolin-aloe (DERMOPLAST) 20-0 5 % topical spray Apply 1 application topically every 6 (six) hours as needed for mild pain or irritation  0   • docusate sodium (COLACE) 100 mg capsule Take 1 capsule (100 mg total) by mouth 2 (two) times a day  0   • guaiFENesin (MUCINEX) 600 mg 12 hr tablet Take 1 tablet (600 mg total) by mouth every 12 (twelve) hours  0   • ibuprofen (MOTRIN) 200 mg tablet Take 3 tablets (600 mg total) by mouth every 6 (six) hours as needed for moderate pain     • ibuprofen (MOTRIN) 600 mg tablet Take 1 tablet (600 mg total) by mouth every 6 (six) hours 30 tablet 0   • sertraline (ZOLOFT) 25 mg tablet Take 1 tablet (25 mg total) by mouth daily  0   • witch hazel-glycerin (TUCKS) topical pad Apply 1 pad topically every 4 (four) hours as needed for irritation  0     No current facility-administered medications for this visit          Allergies   Allergen Reactions   • Codeine        Review of Systems    Video Exam    There were no vitals filed for this visit  Physical Exam     Behavioral Health Psychotherapy Progress Note    Psychotherapy Provided: Individual Psychotherapy     1  Anxiety            Goals addressed in session: Goal 1     DATA:   During this session, this clinician used the following therapeutic modalities: Engagement Strategies and Supportive Psychotherapy    Substance Abuse was not addressed during this session  If the client is diagnosed with a co-occurring substance use disorder, please indicate any changes in the frequency or amount of use: na  Stage of change for addressing substance use diagnoses: No substance use/Not applicable    ASSESSMENT:  Jarad Duran presents with a Euthymic/ normal mood  her affect is Normal range and intensity, which is congruent, with her mood and the content of the session  The client has made progress on their goals  Jarad Duran presents with a none risk of suicide, none risk of self-harm, and none risk of harm to others  For any risk assessment that surpasses a "low" rating, a safety plan must be developed  A safety plan was indicated: no  If yes, describe in detail na    PLAN: Between sessions, Jarad Duran will continue to utilize coping skills  At the next session, the therapist will use na to address anxiety  Behavioral Health Treatment Plan and Discharge Planning: Jarad Duran is aware of and agrees to continue to work on their treatment plan  They have identified and are working toward their discharge goals  yes    Visit start and stop times:    03/21/23  Start Time: 1006  Stop Time: 1027  Total Visit Time: 21 minutes     Psychiatric Discharge Summary   Discharge Summary: Admission Date 1/20/23   Discharge Diagnosis:  1   Anxiety          Treating Physician: bernice Treatment Complications: na, Admit with discharge: na  Prognosis at time of discharge: Excellent   Presenting Problems/Pertinent Findings: Post Partum Anxiety   Therapist: Jayla Castle LCSW  Course of Treatment: Individual Couseling  Summary of Treatment Progress: Pt did well throughout    To what extent did the consumer achieve their goals? All  Criteria for Discharge: Have completed tx goals and objectives and are no longer in need of services  Aftercare Recommendations: PRN  Discharge Medications:   Current Outpatient Medications:   •  acetaminophen (TYLENOL) 325 mg tablet, Take 2 tablets (650 mg total) by mouth every 4 (four) hours as needed for mild pain, Disp: , Rfl: 0  •  acetaminophen (TYLENOL) 325 mg tablet, Take 3 tablets (975 mg total) by mouth every 6 (six) hours as needed for mild pain, Disp: , Rfl: 0  •  benzocaine-menthol-lanolin-aloe (DERMOPLAST) 20-0 5 % topical spray, Apply 1 application topically every 6 (six) hours as needed for mild pain or irritation, Disp: , Rfl: 0  •  docusate sodium (COLACE) 100 mg capsule, Take 1 capsule (100 mg total) by mouth 2 (two) times a day, Disp: , Rfl: 0  •  guaiFENesin (MUCINEX) 600 mg 12 hr tablet, Take 1 tablet (600 mg total) by mouth every 12 (twelve) hours, Disp: , Rfl: 0  •  ibuprofen (MOTRIN) 200 mg tablet, Take 3 tablets (600 mg total) by mouth every 6 (six) hours as needed for moderate pain, Disp: , Rfl:   •  ibuprofen (MOTRIN) 600 mg tablet, Take 1 tablet (600 mg total) by mouth every 6 (six) hours, Disp: 30 tablet, Rfl: 0  •  sertraline (ZOLOFT) 25 mg tablet, Take 1 tablet (25 mg total) by mouth daily, Disp: , Rfl: 0  •  witch hazel-glycerin (TUCKS) topical pad, Apply 1 pad topically every 4 (four) hours as needed for irritation, Disp: , Rfl: 0     Describe consumers ability and willingness to work and solve her mental problem  Pt attended all scheduled therapy sessions  She completed home work assignments and utilized coping skills discussed  Substance Abuse History:  Social History     Substance and Sexual Activity   Drug Use No       Family Psychiatric History: No family history on file      The following portions of the patient's history were reviewed and updated as appropriate: allergies, current medications, past family history, past medical history, past social history, past surgical history and problem list     Social History     Socioeconomic History   • Marital status: /Civil Union     Spouse name: Not on file   • Number of children: Not on file   • Years of education: Not on file   • Highest education level: Not on file   Occupational History   • Not on file   Tobacco Use   • Smoking status: Every Day   • Smokeless tobacco: Not on file   Vaping Use   • Vaping Use: Former   Substance and Sexual Activity   • Alcohol use: Yes     Comment: social    • Drug use: No   • Sexual activity: Not on file   Other Topics Concern   • Not on file   Social History Narrative   • Not on file     Social Determinants of Health     Financial Resource Strain: Not on file   Food Insecurity: Not on file   Transportation Needs: Not on file   Physical Activity: Not on file   Stress: Not on file   Social Connections: Not on file   Intimate Partner Violence: Not on file   Housing Stability: Not on file     Social History     Social History Narrative   • Not on file       Mental Status at Time of most recent visit on 3/21/23       Mental status:  Appearance calm and cooperative   Mood mood appropriate  Affect affect appropriate   Speech a normal rate  Thought Processes normal thought processes  Hallucinations no hallucinations present   Thought Content no delusions  Abnormal Thoughts no suicidal thoughts  and no homicidal thoughts   Orientation  oriented to person and place and time  Remote Memory short term memory intact and long term memory intact  Attention Span concentration intact  Intellect Appears to be of Average Intelligence  Fund of Knowledge displays adequate knowledge of current events  Insight Insight intact  Judgement judgment was intact  Muscle Strength Muscle strength and tone were normal  Language na  Fund of Knowledge displays adequate knowledge of current events  Pain none  Pain Scale 0

## 2023-05-23 ENCOUNTER — OFFICE VISIT (OUTPATIENT)
Dept: PHYSICAL THERAPY | Facility: REHABILITATION | Age: 33
End: 2023-05-23

## 2023-05-23 DIAGNOSIS — O92.29 PAIN OF BREAST DURING BREASTFEEDING: Primary | ICD-10-CM

## 2023-05-23 NOTE — PROGRESS NOTES
"PT Evaluation     Today's date: 2023  Patient name: Jimena Carlin  : 1990  MRN: [id number]  Referring provider: No ref  provider found  Dx:   Encounter Diagnosis     ICD-10-CM    1  Pain of breast during breastfeeding  O92 29                      Assessment  Assessment details: Jimena Carlin is a 29y o  year old female who presents with breast tissue pain and stasis of one or more milk ducts which limits her ability to care for herself and her child sufficiently  She responded positively today to manual lymphatic drainage and breast soft tissue manipulation techniques  She is to return for further PT treatment as needed, at least 1-3 times  Patient would benefit from skilled physical therapy to address aforementioned impairments and improve function and overall quality of life           Education provided to the patient included:  · Reassuring patient that symptoms can be resolved with conservative care  · Reminding that lactating breasts can feel lumpy and sometimes painful, this is not abnormal  · Avoiding \"pumping to empty\" and only feeding on demand since milk removal increases milk production and can lead to further congestion  · Avoiding the use of nipple shields as infants often passively drink from the shield without latching and effectively extracting milk  · Wearing an appropriately fitting bra to avoid dependent lymphedema and/or cervicothoracic pain  · Avoiding deep massage of breast tissue to prevent microvascular injury  · Encouraging anti-inflammatory strategies including NSAIDS as directed by their medical provider, ice up to every hour, and sunflower or soy lecithin supplementation of 5-10 g which may reduce inflammation and help emulsify milk        Impairments: abnormal or restricted ROM, activity intolerance and pain with function  Functional limitations: inability to express breast milk to feed infant  Goals  1  Reduce breast pain to 0/10 by discharge    2  Patient " 23-Nov-2019 01:59 will be able to successfully express milk from entire affected breast region compared to baseline  3  Patient will present with improved postural awareness in upper thoracic region while demonstrating successful holding positions of baby  4  Patient will be appropriate to discharge to self management  Plan  Patient would benefit from: skilled physical therapy  Planned modality interventions: ultrasound and thermotherapy: hydrocollator packs  Planned therapy interventions: manual therapy, behavior modification, activity modification, body mechanics training, stretching, self care, postural training and patient education  Duration in visits: 12  Duration in weeks: 4  Plan of Care beginning date: 2023  Plan of Care expiration date: 2023  Treatment plan discussed with: patient        Subjective Evaluation    History of Present Illness  Mechanism of injury: Patient presents with concern of a blocked milk duct in the right breast along 1-3 o'clock region  She has experienced discomfort and decreased success with milk expression for 11 days  Self techniques to resolve the blockage have proven unsuccessful  She has been on antibiotics for 6 days with improvement of pain, edema, and flulike symptoms but supply remains lower than usual and pain persists into axilla    Pain  Current pain ratin  At best pain rating: 3  At worst pain rating: 10    Patient Goals  Patient goals for therapy: decreased pain          Objective     Observations     Additional Observation Details        Palpation     Additional Palpation Details  Indurated/tender duct in this area of 1-3 o'clock and 6-8 o'clock as compared with other regions of right breast      There are no observable signs of infection suggesting bacterial mastitis               Precautions: early postpartum    Manuals         MLD 30'        Breast mobilization 10'                                            Ther Ex         Self-MLD handout provided Ther Activity         Self-care recommendations Per ABM protocol                          Modalities         Therapeutic US

## 2023-05-23 NOTE — LETTER
May 25, 2023    Mendel ModestRaymundo Ruadiel Andrei El-Jazzar  [address] [place] [address]    Patient: Fly Malin   YOB: 1990   Date of Visit: 2023     Encounter Diagnosis     ICD-10-CM    1  Pain of breast during breastfeeding  O92 29           Dear Dr Arden Cordero: Thank you for your recent referral of Fly Malin  Please review the attached evaluation summary from Abril's recent visit  Please verify that you agree with the plan of care by signing the attached order  If you have any questions or concerns, please do not hesitate to call  I sincerely appreciate the opportunity to share in the care of one of your patients and hope to have another opportunity to work with you in the near future  Sincerely,    Angelita Newton, PT      Referring Provider:      I certify that I have read the below Plan of Care and certify the need for these services furnished under this plan of treatment while under my care  Mendel Modest, DO  [address]  Via Fax: [phone]          PT Evaluation     Today's date: 2023  Patient name: Fly Malin  : 1990  MRN: [id number]  Referring provider: No ref  provider found  Dx:   Encounter Diagnosis     ICD-10-CM    1  Pain of breast during breastfeeding  O92 29                      Assessment  Assessment details: Fly Malin is a 29y o  year old female who presents with breast tissue pain and stasis of one or more milk ducts which limits her ability to care for herself and her child sufficiently  She responded positively today to manual lymphatic drainage and breast soft tissue manipulation techniques  She is to return for further PT treatment as needed, at least 1-3 times       Patient would benefit from skilled physical therapy to address aforementioned impairments and improve function and overall quality of life           Education provided "to the patient included:  · Reassuring patient that symptoms can be resolved with conservative care  · Reminding that lactating breasts can feel lumpy and sometimes painful, this is not abnormal  · Avoiding \"pumping to empty\" and only feeding on demand since milk removal increases milk production and can lead to further congestion  · Avoiding the use of nipple shields as infants often passively drink from the shield without latching and effectively extracting milk  · Wearing an appropriately fitting bra to avoid dependent lymphedema and/or cervicothoracic pain  · Avoiding deep massage of breast tissue to prevent microvascular injury  · Encouraging anti-inflammatory strategies including NSAIDS as directed by their medical provider, ice up to every hour, and sunflower or soy lecithin supplementation of 5-10 g which may reduce inflammation and help emulsify milk        Impairments: abnormal or restricted ROM, activity intolerance and pain with function  Functional limitations: inability to express breast milk to feed infant  Goals  1  Reduce breast pain to 0/10 by discharge  2  Patient will be able to successfully express milk from entire affected breast region compared to baseline  3  Patient will present with improved postural awareness in upper thoracic region while demonstrating successful holding positions of baby  4  Patient will be appropriate to discharge to self management         Plan  Patient would benefit from: skilled physical therapy  Planned modality interventions: ultrasound and thermotherapy: hydrocollator packs  Planned therapy interventions: manual therapy, behavior modification, activity modification, body mechanics training, stretching, self care, postural training and patient education  Duration in visits: 12  Duration in weeks: 4  Plan of Care beginning date: 5/23/2023  Plan of Care expiration date: 6/20/2023  Treatment plan discussed with: patient        Subjective Evaluation    History of " Present Illness  Mechanism of injury: Patient presents with concern of a blocked milk duct in the right breast along 1-3 o'clock region  She has experienced discomfort and decreased success with milk expression for 11 days  Self techniques to resolve the blockage have proven unsuccessful  She has been on antibiotics for 6 days with improvement of pain, edema, and flulike symptoms but supply remains lower than usual and pain persists into axilla    Pain  Current pain ratin  At best pain rating: 3  At worst pain rating: 10    Patient Goals  Patient goals for therapy: decreased pain          Objective     Observations     Additional Observation Details        Palpation     Additional Palpation Details  Indurated/tender duct in this area of 1-3 o'clock and 6-8 o'clock as compared with other regions of right breast      There are no observable signs of infection suggesting bacterial mastitis              Precautions: early postpartum    Manuals         MLD 30'        Breast mobilization 10'                                            Ther Ex         Self-MLD handout provided                                   Ther Activity         Self-care recommendations Per ABM protocol                          Modalities         Therapeutic US

## 2023-05-24 ENCOUNTER — OFFICE VISIT (OUTPATIENT)
Dept: PHYSICAL THERAPY | Facility: REHABILITATION | Age: 33
End: 2023-05-24

## 2023-05-24 DIAGNOSIS — O92.29 PAIN OF BREAST DURING BREASTFEEDING: Primary | ICD-10-CM

## 2023-05-24 NOTE — PROGRESS NOTES
Daily Note     Today's date: 2023  Patient name: Desi Dumont  : 1990  MRN: [id number]  Referring provider: Gabino Hector MD  Dx:   Encounter Diagnosis     ICD-10-CM    1  Pain of breast during breastfeeding  O92 29                      Subjective: R inferior area of blockage worsened in the evening but relieved overnight with midnight pump  Objective: See treatment diary below    Assessment: Tolerated treatment well  Continued MLD and breast mobilization with moderately deep techniques to R inferior breast and L inferior breast  Added therapeutic US to R breast pre-manuals  Plan: Continue per plan of care        Precautions: early postpartum    Manuals        MLD 30' 15' LESLIE breast (MS and CRM)       Breast mobilization 10' 15' LESLIE breast (MS and CRM)                                           Ther Ex         Self-MLD handout provided                                   Ther Activity         Self-care recommendations Per ABM protocol                          Modalities         Therapeutic US  1 8w/cm2 x8 min R breast

## 2023-05-25 ENCOUNTER — APPOINTMENT (OUTPATIENT)
Dept: PHYSICAL THERAPY | Facility: REHABILITATION | Age: 33
End: 2023-05-25
Payer: COMMERCIAL

## 2023-05-30 ENCOUNTER — OFFICE VISIT (OUTPATIENT)
Dept: PHYSICAL THERAPY | Facility: REHABILITATION | Age: 33
End: 2023-05-30

## 2023-05-30 DIAGNOSIS — O92.29 PAIN OF BREAST DURING BREASTFEEDING: Primary | ICD-10-CM

## 2023-05-30 NOTE — PROGRESS NOTES
"Daily Note     Today's date: 2023  Patient name: Osvaldo Damico  : 1990  MRN: [id number]  Referring provider: Marcy Rosario MD  Dx:   Encounter Diagnosis     ICD-10-CM    1  Pain of breast during breastfeeding  O92 29                      Subjective: Continues with MLD and gentle R breast mobilization, and feels that these \"moderately deep\" techniques are most effective in reducing pain and induration  Objective: See treatment diary below    Assessment: Tolerated treatment well  Continued MLD and breast mobilization with moderately deep techniques to R breast  There is good softening of R Upper inner quadrant and moderate pain reduction of right lower outer quadrant  Plan: Continue per plan of care        Precautions: early postpartum    Manuals       MLD 30' 15' LESLIE breast (MS and CRM) 13' R breast      Breast mobilization 10' 15' LESLIE breast (MS and CRM) 27' R breast                                          Ther Ex         Self-MLD handout provided  reviewed                                 Ther Activity         Self-care recommendations Per ABM protocol                          Modalities         Therapeutic US  1 8w/cm2 x8 min R breast                     "

## 2023-06-01 ENCOUNTER — OFFICE VISIT (OUTPATIENT)
Dept: PHYSICAL THERAPY | Facility: REHABILITATION | Age: 33
End: 2023-06-01

## 2023-06-01 DIAGNOSIS — O92.29 PAIN OF BREAST DURING BREASTFEEDING: Primary | ICD-10-CM

## 2023-06-01 NOTE — PROGRESS NOTES
Daily Note     Today's date: 2023  Patient name: Everton Balderrama  : 1990  MRN: [id number]  Referring provider: Reese Baltazar MD  Dx:   Encounter Diagnosis     ICD-10-CM    1  Pain of breast during breastfeeding  O92 29           Start Time:   Stop Time:   Total time in clinic (min): 45 minutes    Subjective: Pt was able to sleep straight for 6 hours first night after seeing Melquiades Grissom, is pumping every 4 1/2 hours during the day (2 ounces) but then this morning after the second night felt more of a blockage on superior aspect of her R breast      Objective: See treatment diary below    Assessment: Tolerated treatment well  Responds well to manual techniques which were only performed on her R side  Also applied k-tape at the end of the session in an octopus shape (2 pieces, one coming from the lateral aspect and one from the medial side)  Responds well with decreased symptoms post session  Plan: Continue per plan of care        Precautions: early postpartum    Manuals  6     MLD 30' 15' LESLIE breast (MS and CRM) 13' R breast 10'     Breast mobilization 10' 15' LESLIE breast (MS and CRM) 27' R breast 30'     k-tape    2 pieces of octopus                                Ther Ex         Self-MLD handout provided  reviewed                                 Ther Activity         Self-care recommendations Per ABM protocol                          Modalities         Therapeutic US  1 8w/cm2 x8 min R breast

## 2023-06-08 ENCOUNTER — OFFICE VISIT (OUTPATIENT)
Dept: PHYSICAL THERAPY | Facility: REHABILITATION | Age: 33
End: 2023-06-08
Payer: COMMERCIAL

## 2023-06-08 DIAGNOSIS — O92.29 PAIN OF BREAST DURING BREASTFEEDING: Primary | ICD-10-CM

## 2023-06-08 PROCEDURE — 97530 THERAPEUTIC ACTIVITIES: CPT

## 2023-06-08 NOTE — PROGRESS NOTES
Daily Note     Today's date: 2023  Patient name: Trung Solorzano  : 1990  MRN: [id number]  Referring provider: Pasqual Lombard, MD  Dx:   Encounter Diagnosis     ICD-10-CM    1  Pain of breast during breastfeeding  O92 29           Start Time: 1630  Stop Time: 1700  Total time in clinic (min): 30 minutes    Subjective: Pt slept 8 hours after the second night following the application of the tape and did not have any issues  Denies any R breast tenderness or blockage  Would like to know how to apply the k-tape  Objective: See treatment diary below    Assessment: Tolerated treatment well  Took this session to instruct Silvinairaj Nasir how to apply the tape, certain precautions to take and how to remove it  Encouraged to remove after 3 days to avoid irritating the skin  Re-applied in the same octopus formation  Pt will reach back out when she starts weaning the L breast or before if she has any issues  Plan: Continue per plan of care        Precautions: early postpartum    Manuals  6    MLD 30' 15' LESLIE breast (MS and CRM) 13' R breast 10'     Breast mobilization 10' 15' LESLIE breast (MS and CRM) 27' R breast 30'     k-tape    2 pieces of octopus                                Ther Ex         Self-MLD handout provided  reviewed                                 Ther Activity         Self-care recommendations Per ABM protocol        k-tape     25' k-tape application & precautions             Modalities         Therapeutic US  1 8w/cm2 x8 min R breast

## 2025-06-09 ENCOUNTER — TRANSCRIBE ORDERS (OUTPATIENT)
Dept: PERINATAL CARE | Facility: OTHER | Age: 35
End: 2025-06-09

## 2025-06-09 DIAGNOSIS — O09.899 SUPERVISION OF OTHER HIGH RISK PREGNANCY, ANTEPARTUM: Primary | ICD-10-CM

## 2025-08-07 ENCOUNTER — ROUTINE PRENATAL (OUTPATIENT)
Dept: PERINATAL CARE | Facility: CLINIC | Age: 35
End: 2025-08-07
Attending: PHYSICIAN ASSISTANT
Payer: COMMERCIAL

## 2025-08-07 ENCOUNTER — ANCILLARY PROCEDURE (OUTPATIENT)
Dept: PERINATAL CARE | Facility: CLINIC | Age: 35
End: 2025-08-07
Attending: OBSTETRICS & GYNECOLOGY
Payer: COMMERCIAL

## 2025-08-07 VITALS
WEIGHT: 139.2 LBS | HEIGHT: 60 IN | SYSTOLIC BLOOD PRESSURE: 118 MMHG | DIASTOLIC BLOOD PRESSURE: 68 MMHG | HEART RATE: 101 BPM | BODY MASS INDEX: 27.33 KG/M2

## 2025-08-07 DIAGNOSIS — Z3A.20 20 WEEKS GESTATION OF PREGNANCY: Primary | ICD-10-CM

## 2025-08-07 DIAGNOSIS — O09.899 SUPERVISION OF OTHER HIGH RISK PREGNANCY, ANTEPARTUM: ICD-10-CM

## 2025-08-07 DIAGNOSIS — O09.299 HISTORY OF PRE-ECLAMPSIA IN PRIOR PREGNANCY, CURRENTLY PREGNANT, UNSPECIFIED TRIMESTER: ICD-10-CM

## 2025-08-07 DIAGNOSIS — Z82.49 FAMILY HISTORY OF THROMBOEMBOLIC DISEASE: ICD-10-CM

## 2025-08-07 DIAGNOSIS — Z98.890 S/P LEEP: ICD-10-CM

## 2025-08-07 DIAGNOSIS — Z36.3 ENCOUNTER FOR ANTENATAL SCREENING FOR MALFORMATIONS: ICD-10-CM

## 2025-08-07 DIAGNOSIS — Z36.86 ENCOUNTER FOR ANTENATAL SCREENING FOR CERVICAL LENGTH: ICD-10-CM

## 2025-08-07 PROBLEM — Z3A.39 39 WEEKS GESTATION OF PREGNANCY: Status: RESOLVED | Noted: 2022-12-20 | Resolved: 2025-08-07

## 2025-08-07 PROBLEM — Z87.59 STATUS POST VACUUM-ASSISTED VAGINAL DELIVERY: Status: RESOLVED | Noted: 2023-01-12 | Resolved: 2025-08-07

## 2025-08-07 PROBLEM — Z87.891 FORMER SMOKER: Status: RESOLVED | Noted: 2023-01-09 | Resolved: 2025-08-07

## 2025-08-07 PROCEDURE — 76817 TRANSVAGINAL US OBSTETRIC: CPT | Performed by: OBSTETRICS & GYNECOLOGY

## 2025-08-07 PROCEDURE — 99203 OFFICE O/P NEW LOW 30 MIN: CPT | Performed by: OBSTETRICS & GYNECOLOGY

## 2025-08-07 PROCEDURE — 76805 OB US >/= 14 WKS SNGL FETUS: CPT | Performed by: OBSTETRICS & GYNECOLOGY

## 2025-08-07 RX ORDER — ASPIRIN 81 MG/1
162 TABLET ORAL DAILY
COMMUNITY

## (undated) DEVICE — Device

## (undated) DEVICE — REM POLYHESIVE ADULT PATIENT RETURN ELECTRODE: Brand: VALLEYLAB

## (undated) DEVICE — SMOKE EVACUATION TUBING WITH 7/8 IN TO 1/4 IN REDUCER: Brand: BUFFALO FILTER

## (undated) DEVICE — SUT SILK 2-0 SH 30 IN K833H

## (undated) DEVICE — BUTTON SWITCH PENCIL HOLSTER: Brand: VALLEYLAB

## (undated) DEVICE — SCD SEQUENTIAL COMPRESSION COMFORT SLEEVE MEDIUM KNEE LENGTH: Brand: KENDALL SCD

## (undated) DEVICE — STRL UNIVERSAL MINOR VAGINAL: Brand: CARDINAL HEALTH

## (undated) DEVICE — TUNGSTEN LOOP ELECTRODE: Brand: VALLEYLAB

## (undated) DEVICE — GLOVE SRG BIOGEL 6.5

## (undated) DEVICE — TUBING SUCTION 5MM X 12 FT

## (undated) DEVICE — ELECTRODE CUTTING LOOP 15/12 E1560

## (undated) DEVICE — GLOVE INDICATOR PI UNDERGLOVE SZ 7 BLUE

## (undated) DEVICE — LEEP BALL ELECTRODE 5 MM

## (undated) DEVICE — CAUTERY TIP POLISHER: Brand: DEVON

## (undated) DEVICE — EXCISOR BIOPSY FISCHER CONE LG SHALLOW

## (undated) DEVICE — EXCISOR BIOPSY FISCHER CONE LRG

## (undated) DEVICE — EXCISOR BIOPSY FISCHER CONE MED EXT

## (undated) DEVICE — EXCISOR BIOPSY FISCHER CONE MED

## (undated) DEVICE — STERILE 8 INCH PROCTO SWAB: Brand: CARDINAL HEALTH

## (undated) DEVICE — LEEPLOOP ELEC 20 X 15